# Patient Record
Sex: FEMALE | Race: WHITE | HISPANIC OR LATINO | Employment: FULL TIME | URBAN - METROPOLITAN AREA
[De-identification: names, ages, dates, MRNs, and addresses within clinical notes are randomized per-mention and may not be internally consistent; named-entity substitution may affect disease eponyms.]

---

## 2017-02-14 ENCOUNTER — OFFICE VISIT (OUTPATIENT)
Dept: URGENT CARE | Facility: URGENT CARE | Age: 27
End: 2017-02-14
Payer: COMMERCIAL

## 2017-02-14 VITALS
SYSTOLIC BLOOD PRESSURE: 100 MMHG | WEIGHT: 217.5 LBS | HEART RATE: 90 BPM | OXYGEN SATURATION: 100 % | TEMPERATURE: 97.7 F | BODY MASS INDEX: 38.53 KG/M2 | DIASTOLIC BLOOD PRESSURE: 70 MMHG

## 2017-02-14 DIAGNOSIS — J02.9 ACUTE PHARYNGITIS, UNSPECIFIED ETIOLOGY: Primary | ICD-10-CM

## 2017-02-14 DIAGNOSIS — J01.90 ACUTE SINUSITIS WITH SYMPTOMS > 10 DAYS: ICD-10-CM

## 2017-02-14 LAB
DEPRECATED S PYO AG THROAT QL EIA: NORMAL
MICRO REPORT STATUS: NORMAL
SPECIMEN SOURCE: NORMAL

## 2017-02-14 PROCEDURE — 87880 STREP A ASSAY W/OPTIC: CPT | Performed by: PHYSICIAN ASSISTANT

## 2017-02-14 PROCEDURE — 99203 OFFICE O/P NEW LOW 30 MIN: CPT | Performed by: FAMILY MEDICINE

## 2017-02-14 PROCEDURE — 87081 CULTURE SCREEN ONLY: CPT | Performed by: PHYSICIAN ASSISTANT

## 2017-02-14 RX ORDER — AMOXICILLIN 500 MG/1
1000 CAPSULE ORAL 2 TIMES DAILY
Qty: 40 CAPSULE | Refills: 0 | Status: SHIPPED | OUTPATIENT
Start: 2017-02-14 | End: 2017-02-24

## 2017-02-14 NOTE — LETTER
[facility]  [address], [place] [id number] [phone]      February 14, 2017    RE:  Chely Sharp                                                                                                                                                       [address]   [facility] [address]            To whom it may concern:    Chely Sharp is under my professional care for a medical illness.   She  may return to work with no restrictions after she has been on antibiotics for 24 hours.    Sincerely,        Malorie Cook M.D.  [facility]

## 2017-02-14 NOTE — MR AVS SNAPSHOT
"              After Visit Summary   2017    Chely Sharp    MRN: [id number]           Patient Information     Date Of Birth          1990        Visit Information        Provider Department      2017 8:45 PM Malorie Cook MD [facility]        Today's Diagnoses     Acute pharyngitis, unspecified etiology    -  1    Acute sinusitis with symptoms > 10 days           Follow-ups after your visit        Who to contact     If you have questions or need follow up information about today's clinic visit or your schedule please contact [facility] directly at [phone].  Normal or non-critical lab and imaging results will be communicated to you by MyChart, letter or phone within 4 business days after the clinic has received the results. If you do not hear from us within 7 days, please contact the clinic through [employer] or phone. If you have a critical or abnormal lab result, we will notify you by phone as soon as possible.  Submit refill requests through [employer] or call your pharmacy and they will forward the refill request to us. Please allow 3 business days for your refill to be completed.          Additional Information About Your Visit        MyChart Information     [employer] lets you send messages to your doctor, view your test results, renew your prescriptions, schedule appointments and more. To sign up, go to [email or web address] . Click on \"Log in\" on the left side of the screen, which will take you to the Welcome page. Then click on \"Sign up Now\" on the right side of the page.     You will be asked to enter the access code listed below, as well as some personal information. Please follow the directions to create your username and password.     Your access code is: [email or web address]  Expires: 3/25/2017  3:41 PM     Your access code will  in 90 days. If you need help or a new code, please call your [facility] clinic or [phone].        Care EveryWhere ID     " This is your Care EveryWhere ID. This could be used by other organizations to access your [facility] medical records  [id number]        Your Vitals Were     Pulse Temperature Pulse Oximetry BMI (Body Mass Index)          90 97.7  F (36.5  C) (Oral) 100% 38.53 kg/m2         Blood Pressure from Last 3 Encounters:   02/14/17 100/70   12/25/16 131/48   07/25/16 123/56    Weight from Last 3 Encounters:   02/14/17 217 lb 8 oz (98.7 kg)   07/25/16 220 lb (99.8 kg)   12/13/15 210 lb (95.3 kg)              We Performed the Following     Beta strep group A culture     Strep, Rapid Screen          Today's Medication Changes          These changes are accurate as of: 2/14/17  9:12 PM.  If you have any questions, ask your nurse or doctor.               Start taking these medicines.        Dose/Directions    amoxicillin 500 MG capsule   Commonly known as:  AMOXIL   Used for:  Acute sinusitis with symptoms > 10 days   Started by:  Malorie Cook MD        Dose:  1000 mg   Take 2 capsules (1,000 mg) by mouth 2 times daily for 10 days   Quantity:  40 capsule   Refills:  0            Where to get your medicines      These medications were sent to [facility] - [place] - [address] [facility]  AT [facility] [address] [place] [address] [facility] [place] [address]     Phone:  [phone]     amoxicillin 500 MG capsule                Primary Care Provider Office Phone # Fax #    [facility] [phone]       [address] [place] [address]        Thank you!     Thank you for choosing [facility]  for your care. Our goal is always to provide you with excellent care. Hearing back from our patients is one way we can continue to improve our services. Please take a few minutes to complete the written survey that you may receive in the mail after your visit with us. Thank you!             Your Updated Medication List - Protect others around you: Learn how to safely use,  store and throw away your medicines at www.disposemymeds.org.          This list is accurate as of: 2/14/17  9:12 PM.  Always use your most recent med list.                   Brand Name Dispense Instructions for use    amoxicillin 500 MG capsule    AMOXIL    40 capsule    Take 2 capsules (1,000 mg) by mouth 2 times daily for 10 days

## 2017-02-15 NOTE — PROGRESS NOTES
SUBJECTIVE:   Chely Sharp is a 26 year old female presenting with a chief complaint of nasal congestion.  Onset of symptoms was 4 week(s) ago.  Course of illness is same.    Severity moderate  Current and Associated symptoms: sore throat for 1 day.  No known fevers, but has been feeling especially unwell today.  No GI symptoms.  Occasionally dizzy, especially first thing in the morning.  Has had frontal headaches as well.  Predisposing factors include None.    No past medical history on file.    Social History   Substance Use Topics     Smoking status: Never Smoker     Smokeless tobacco: Not on file     Alcohol use Yes      Comment: occ       ROS:  Review of systems negative except as stated above.    OBJECTIVE  :/70  Pulse 90  Temp 97.7  F (36.5  C) (Oral)  Wt 217 lb 8 oz (98.7 kg)  SpO2 100%  BMI 38.53 kg/m2  GENERAL APPEARANCE: healthy, alert and no distress  EYES: anicteric sclerae, conjunctivae clear  HENT: ear canals and TM's normal.  Nose and mouth without ulcers, erythema or lesions.  Turbinates swollen bilaterally.  NECK: supple, nontender, no lymphadenopathy  RESP: lungs clear to auscultation - no rales, rhonchi or wheezes  CV: regular rate and rhythm, normal S1 S2, no murmur noted  SKIN: no suspicious lesions or rashes    RST negative.  Culture pending.    ASSESSMENT:  Acute sinusitis > 10 days    PLAN:  Amoxicillin 1000 mg PO BID x 10 days.  Follow up with primary MD if not improving or if significantly worsening.

## 2017-02-16 LAB
BACTERIA SPEC CULT: NORMAL
MICRO REPORT STATUS: NORMAL
SPECIMEN SOURCE: NORMAL

## 2017-09-23 ENCOUNTER — HOSPITAL ENCOUNTER (EMERGENCY)
Facility: CLINIC | Age: 27
Discharge: HOME | End: 2017-09-23
Attending: NURSE PRACTITIONER | Admitting: NURSE PRACTITIONER
Payer: COMMERCIAL

## 2017-09-23 VITALS
BODY MASS INDEX: 32.65 KG/M2 | HEART RATE: 78 BPM | WEIGHT: 208 LBS | HEIGHT: 67 IN | RESPIRATION RATE: 18 BRPM | TEMPERATURE: 99.1 F | DIASTOLIC BLOOD PRESSURE: 63 MMHG | SYSTOLIC BLOOD PRESSURE: 103 MMHG

## 2017-09-23 DIAGNOSIS — R30.0 DYSURIA: ICD-10-CM

## 2017-09-23 DIAGNOSIS — R35.0 URINARY FREQUENCY: ICD-10-CM

## 2017-09-23 DIAGNOSIS — N39.0 URINARY TRACT INFECTION WITHOUT HEMATURIA, SITE UNSPECIFIED: ICD-10-CM

## 2017-09-23 LAB
ALBUMIN UR-MCNC: NEGATIVE MG/DL
APPEARANCE UR: ABNORMAL
BACTERIA #/AREA URNS HPF: ABNORMAL /HPF
BILIRUB UR QL STRIP: NEGATIVE
COLOR UR AUTO: YELLOW
GLUCOSE UR STRIP-MCNC: NEGATIVE MG/DL
HCG UR QL: NEGATIVE
HGB UR QL STRIP: ABNORMAL
KETONES UR STRIP-MCNC: NEGATIVE MG/DL
LEUKOCYTE ESTERASE UR QL STRIP: ABNORMAL
NITRATE UR QL: NEGATIVE
PH UR STRIP: 6.5 PH (ref 5–7)
RBC #/AREA URNS AUTO: ABNORMAL /HPF
SOURCE: ABNORMAL
SP GR UR STRIP: 1.02 (ref 1–1.03)
UROBILINOGEN UR STRIP-ACNC: 0.2 EU/DL (ref 0.2–1)
WBC #/AREA URNS AUTO: ABNORMAL /HPF

## 2017-09-23 PROCEDURE — 81001 URINALYSIS AUTO W/SCOPE: CPT | Performed by: NURSE PRACTITIONER

## 2017-09-23 PROCEDURE — 81025 URINE PREGNANCY TEST: CPT | Performed by: NURSE PRACTITIONER

## 2017-09-23 PROCEDURE — 99283 EMERGENCY DEPT VISIT LOW MDM: CPT

## 2017-09-23 RX ORDER — NITROFURANTOIN 25; 75 MG/1; MG/1
100 CAPSULE ORAL 2 TIMES DAILY
Qty: 10 CAPSULE | Refills: 0 | Status: SHIPPED | OUTPATIENT
Start: 2017-09-23 | End: 2017-09-28

## 2017-09-23 ASSESSMENT — ENCOUNTER SYMPTOMS
FEVER: 0
HEMATURIA: 0
DIFFICULTY URINATING: 1
DYSURIA: 1
BACK PAIN: 1
FREQUENCY: 1

## 2017-09-23 NOTE — ED AVS SNAPSHOT
Emergency Department    [address] [place] [address]    Phone:  [phone]    Fax:  [phone]                                       Chely Sharp   MRN: [id number]    Department:   Emergency Department   Date of Visit:  9/23/2017           After Visit Summary Signature Page     I have received my discharge instructions, and my questions have been answered. I have discussed any challenges I see with this plan with the nurse or doctor.    ..........................................................................................................................................  Patient/Patient Representative Signature      ..........................................................................................................................................  Patient Representative Print Name and Relationship to Patient    ..................................................               ................................................  Date                                            Time    ..........................................................................................................................................  Reviewed by Signature/Title    ...................................................              ..............................................  Date                                                            Time

## 2017-09-23 NOTE — ED AVS SNAPSHOT
Emergency Department    [address] [facility] [address]    Phone:  [phone]    Fax:  [phone]                                       Chely Sharp   MRN: [id number]    Department:   Emergency Department   Date of Visit:  9/23/2017           Patient Information     Date Of Birth          1990        Your diagnoses for this visit were:     Dysuria     Urinary frequency     Urinary tract infection without hematuria, site unspecified        You were seen by Claudia Carmona APRN CNP.      Follow-up Information     Follow up with Clinic, [facility] In 3 days.    Why:  if no improvement    Contact information:    [address] [place] [address] [phone]          Discharge Instructions         Urinary Tract Infections in Women    Urinary tract infections (UTIs) are most often caused by bacteria (germs). These bacteria enter the urinary tract. The bacteria may come from outside the body. Or they may travel from the skin outside the rectum or vagina into the urethra. Female anatomy makes it easy for bacteria from the bowel to enter a woman s urinary tract, which is the most common source of UTI. This means women develop UTIs more often than men. Pain in or around the urinary tract is a common UTI symptom. But the only way to know for sure if you have a UTI for the healthcare provider to test your urine. The two tests that may be done are the urinalysis and urine culture.  Types of UTIs    Cystitis: A bladder infection (cystitis) is the most common UTI in women. You may have urgent or frequent urination. You may also have pain, burning when you urinate, and bloody urine.    Urethritis: This is an inflamed urethra, which is the tube that carries urine from the bladder to outside the body. You may have lower stomach or back pain. You may also have urgent or frequent urination.    Pyelonephritis: This is a kidney infection. If not treated, it can be serious and  damage your kidneys. In severe cases, you may be hospitalized. You may have a fever and lower back pain.  Medicines to treat a UTI  Most UTIs are treated with antibiotics. These kill the bacteria. The length of time you need to take them depends on the type of infection. It may be as short as 3 days. If you have repeated UTIs, a low-dose antibiotic may be needed for several months. Take antibiotics exactly as directed. Don t stop taking them until all of the medicine is gone. If you stop taking the antibiotic too soon, the infection may not go away, and you may develop a resistance to the antibiotic. This can make it much harder to treat.  Lifestyle changes to treat and prevent UTIs  The lifestyle changes below will help get rid of your UTI. They may also help prevent future UTIs.    Drink plenty of fluids. This includes water, juice, or other caffeine-free drinks. Fluids help flush bacteria out of your body.    Empty your bladder. Always empty your bladder when you feel the urge to urinate. And always urinate before going to sleep. Urine that stays in your bladder can lead to infection. Try to urinate before and after sex as well.    Practice good personal hygiene. Wipe yourself from front to back after using the toilet. This helps keep bacteria from getting into the urethra.    Use condoms during sex. These help prevent UTIs caused by sexually transmitted bacteria. Also, avoid using spermicides during sex. These can increase the risk of UTIs. Choose other forms of birth control instead. For women who tend to get UTIs after sex, a low-dose of a preventive antibiotic may be used. Be sure to discuss this option with your healthcare provider.    Follow up with your healthcare provider as directed. He or she may test to make sure the infection has cleared. If needed, more treatment may be started.  Date Last Reviewed: 1/1/2017 2000-2017 [employer]. [address] [place] [address], [place] [address]. All rights  reserved. This information is not intended as a substitute for professional medical care. Always follow your healthcare professional's instructions.          24 Hour Appointment Hotline       To make an appointment at any [facility], call [phone] ([phone]). If you don't have a family doctor or clinic, we will help you find one. [facility] [place] clinics are conveniently located to serve the needs of you and your family.             Review of your medicines      START taking        Dose / Directions Last dose taken    nitroFURantoin (macrocrystal-monohydrate) 100 MG capsule   Commonly known as:  MACROBID   Dose:  100 mg   Quantity:  10 capsule        Take 1 capsule (100 mg) by mouth 2 times daily for 5 days   Refills:  0                Prescriptions were sent or printed at these locations (1 Prescription)                   Other Prescriptions                Printed at Department/Unit printer (1 of 1)         nitroFURantoin, macrocrystal-monohydrate, (MACROBID) 100 MG capsule                Procedures and tests performed during your visit     *UA reflex to Microscopic (ED Lab POCT Only 3-11)    HCG qualitative urine    Urine Microscopic      Orders Needing Specimen Collection     None      Pending Results     No orders found from 9/21/2017 to 9/24/2017.            Pending Culture Results     No orders found from 9/21/2017 to 9/24/2017.            Pending Results Instructions     If you had any lab results that were not finalized at the time of your Discharge, you can call the ED Lab Result RN at [phone]. You will be contacted by this team for any positive Lab results or changes in treatment. The nurses are available 7 days a week from 10A to 6:30P.  You can leave a message 24 hours per day and they will return your call.        Test Results From Your Hospital Stay        9/23/2017 10:02 PM      Component Results     Component Value Ref Range & Units Status    Color Urine Yellow  Final    Appearance  Urine Slightly Cloudy  Final    Glucose Urine Negative NEG^Negative mg/dL Final    Bilirubin Urine Negative NEG^Negative Final    Ketones Urine Negative NEG^Negative mg/dL Final    Specific Gravity Urine 1.020 1.003 - 1.035 Final    Blood Urine Trace (A) NEG^Negative Final    pH Urine 6.5 5.0 - 7.0 pH Final    Protein Albumin Urine Negative NEG^Negative mg/dL Final    Urobilinogen Urine 0.2 0.2 - 1.0 EU/dL Final    Nitrite Urine Negative NEG^Negative Final    Leukocyte Esterase Urine Moderate (A) NEG^Negative Final    Source Midstream Urine  Final         9/23/2017  9:53 PM      Component Results     Component Value Ref Range & Units Status    HCG Qual Urine Negative NEG^Negative Final    This test is for screening purposes.  Results should be interpreted along with   the clinical picture.  Confirmation testing is available if warranted by   ordering [id number], HCG Quantitative Pregnancy.           9/23/2017 10:02 PM      Component Results     Component Value Ref Range & Units Status    WBC Urine  (A) OTO2^O - 2 /HPF Final    RBC Urine O - 2 OTO2^O - 2 /HPF Final    Bacteria Urine Few (A) NEG^Negative /HPF Final                Clinical Quality Measure: Blood Pressure Screening     Your blood pressure was checked while you were in the emergency department today. The last reading we obtained was  BP: 103/63 . Please read the guidelines below about what these numbers mean and what you should do about them.  If your systolic blood pressure (the top number) is less than 120 and your diastolic blood pressure (the bottom number) is less than 80, then your blood pressure is normal. There is nothing more that you need to do about it.  If your systolic blood pressure (the top number) is 120-139 or your diastolic blood pressure (the bottom number) is 80-89, your blood pressure may be higher than it should be. You should have your blood pressure rechecked within a year by a primary care provider.  If your systolic blood  "pressure (the top number) is 140 or greater or your diastolic blood pressure (the bottom number) is 90 or greater, you may have high blood pressure. High blood pressure is treatable, but if left untreated over time it can put you at risk for heart attack, stroke, or kidney failure. You should have your blood pressure rechecked by a primary care provider within the next 4 weeks.  If your provider in the emergency department today gave you specific instructions to follow-up with your doctor or provider even sooner than that, you should follow that instruction and not wait for up to 4 weeks for your follow-up visit.        Thank you for choosing [facility]       Thank you for choosing [facility] for your care. Our goal is always to provide you with excellent care. Hearing back from our patients is one way we can continue to improve our services. Please take a few minutes to complete the written survey that you may receive in the mail after you visit with us. Thank you!        [employer] Information     [employer] lets you send messages to your doctor, view your test results, renew your prescriptions, schedule appointments and more. To sign up, go to [email or web address] . Click on \"Log in\" on the left side of the screen, which will take you to the Welcome page. Then click on \"Sign up Now\" on the right side of the page.     You will be asked to enter the access code listed below, as well as some personal information. Please follow the directions to create your username and password.     Your access code is: [email or web address]  Expires: 2017 10:14 PM     Your access code will  in 90 days. If you need help or a new code, please call your [facility] clinic or [phone].        Care EveryWhere ID     This is your Care EveryWhere ID. This could be used by other organizations to access your [facility] medical records  [id number]        Equal Access to Services     JOSE OLIVAS : vidal Renee, " sonu alvares ah. So [facility] [phone].    ATENCIÓN: Si habla ana mañol, tiene a william disposición servicios gratuitos de asistencia lingüística. Llame al [phone].    We comply with applicable federal civil rights laws and [place] laws. We do not discriminate on the basis of race, color, national origin, age, disability sex, sexual orientation or gender identity.            After Visit Summary       This is your record. Keep this with you and show to your community pharmacist(s) and doctor(s) at your next visit.

## 2017-09-24 NOTE — ED PROVIDER NOTES
"  History     Chief Complaint:  Urinary Retention     HPI   Chely Sharp is an otherwise healthy 27 year old female who presents to the emergency department today for evaluation of urinary symptoms. The patient reports intermittent dysuria for the last week that gets worse toward the end of urination and associated with period-like cramps, bloated and full stomach, frequent urination, trouble urinating, and intermittent lower right side back pain. She presents to the ED due to persistent symptoms. She notes having to urinate twice during the night in which she usually does not have to. The patient denies fever and reports her last period was a week and a half ago.     Allergies:  No Known Drug Allergies    Medications:    The patient is currently on no regular medications.    Past Medical History:    History reviewed. No pertinent past medical history.    Past Surgical History:    Gyn Surgery    Family History:    History reviewed. No pertinent family history.     Social History:  The patient was accompanied to the ED by her .  Smoking Status: Never  Alcohol Use: Yes  Marital Status:       Review of Systems   Constitutional: Negative for fever.   Gastrointestinal:        Bloated and full stomach   Genitourinary: Positive for difficulty urinating, dysuria and frequency. Negative for decreased urine volume, hematuria and vaginal bleeding.   Musculoskeletal: Positive for back pain.   All other systems reviewed and are negative.    Physical Exam   First Vitals:  /63  Pulse 78  Temp 99.1  F (37.3  C) (Oral)  Resp 18  Ht 1.702 m (5' 7\")  Wt 94.3 kg (208 lb)  BMI 32.58 kg/m2       Physical Exam  Physical Exam   Constitutional: Pt appears well-developed and well-nourished. Non toxic appearing.   Head: Head moves freely with normal range of motion.   ENT: Oropharynx is clear and moist.   Eyes: Conjunctivae pink. EOMs intact. No scleral icterus.   Neck: Normal range of motion.    Cardiovascular: " Regular rate and rhythm. Normal heart sounds. No concerning murmur.  Pulmonary/Chest: No respiratory distress. No decreased breath sounds. No wheezes. No rhonchi. No rales.   Abdominal: Soft.  No rebound, no guarding. No CVA tenderness. No pain over McBurney's point. Negative Ragsdale's sign. Suprapubic tenderness.   Musculoskeletal: No peripheral edema. Distal capillary refill and sensation intact.  Neurological: Oriented to person, place, and time. No focal deficits.   Skin: Skin is warm and normal in color. No rash noted.      Emergency Department Course     Laboratory:  Laboratory findings were communicated with the patient and family who voiced understanding of the findings.  UA: slightly cloudy yellow urine, trace blood, leukocyte esterase moderate, WBC/HPF , Few Bacteria o/w WNL  HCG Qualitative Urine: Negative     Emergency Department Course:  Nursing notes and vitals reviewed.  The patient provided a urine sample here in the emergency department. This was sent for laboratory testing, findings above.  2130: I performed an exam of the patient as documented above.   2211: Patient rechecked and updated.   I discussed the treatment plan with the patient. They expressed understanding of this plan and consented to discharge. They will be discharged home with instructions for care and follow up. In addition, the patient will return to the emergency department if their symptoms persist, worsen, if new symptoms arise or if there is any concern.  All questions were answered.  I personally reviewed the laboratory results with the Patient and spouse and answered all related questions prior to discharge.    Impression & Plan      Medical Decision Making:  Chely Sharp is a 27 year old female who presents for evaluation of urinary frequency and dysuria.  This clinically is consistent with a urinary tract infection.  Urinalysis confirms the infection.  There has been no fever and she has no CVA tenderness on exam.  She notes intermittent low back pain, but this started with her period. There is no clinical evidence of pyelonephritis, appendicitis, colitis, diverticulitis or any intraabdominal catastrophe. The patient will be started on antibiotics for the infection. No UC necessary given an otherwise healthy non pregnant female. Return if increasing pain, vomiting, fever, or inability to tolerate the oral antibiotic.  Follow up with primary physician is indicated if not improving in 2-3 days. Pt amenable to plan.     Diagnosis:    ICD-10-CM    1. Dysuria R30.0    2. Urinary frequency R35.0    3. Urinary tract infection without hematuria, site unspecified N39.0      Disposition:  discharged to home    Discharge Medications:  New Prescriptions    NITROFURANTOIN, MACROCRYSTAL-MONOHYDRATE, (MACROBID) 100 MG CAPSULE    Take 1 capsule (100 mg) by mouth 2 times daily for 5 days     Scribe Disclosure:  Jackelyn AMBROCIO, am serving as a scribe at 9:39 PM on 9/23/2017 to document services personally performed by Claudia Carmona NP based on my observations and the provider's statements to me.     9/23/2017    EMERGENCY DEPARTMENT       Claudia Carmona APRN CNP  09/23/17 0585

## 2017-09-24 NOTE — DISCHARGE INSTRUCTIONS
Urinary Tract Infections in Women    Urinary tract infections (UTIs) are most often caused by bacteria (germs). These bacteria enter the urinary tract. The bacteria may come from outside the body. Or they may travel from the skin outside the rectum or vagina into the urethra. Female anatomy makes it easy for bacteria from the bowel to enter a woman s urinary tract, which is the most common source of UTI. This means women develop UTIs more often than men. Pain in or around the urinary tract is a common UTI symptom. But the only way to know for sure if you have a UTI for the healthcare provider to test your urine. The two tests that may be done are the urinalysis and urine culture.  Types of UTIs    Cystitis: A bladder infection (cystitis) is the most common UTI in women. You may have urgent or frequent urination. You may also have pain, burning when you urinate, and bloody urine.    Urethritis: This is an inflamed urethra, which is the tube that carries urine from the bladder to outside the body. You may have lower stomach or back pain. You may also have urgent or frequent urination.    Pyelonephritis: This is a kidney infection. If not treated, it can be serious and damage your kidneys. In severe cases, you may be hospitalized. You may have a fever and lower back pain.  Medicines to treat a UTI  Most UTIs are treated with antibiotics. These kill the bacteria. The length of time you need to take them depends on the type of infection. It may be as short as 3 days. If you have repeated UTIs, a low-dose antibiotic may be needed for several months. Take antibiotics exactly as directed. Don t stop taking them until all of the medicine is gone. If you stop taking the antibiotic too soon, the infection may not go away, and you may develop a resistance to the antibiotic. This can make it much harder to treat.  Lifestyle changes to treat and prevent UTIs  The lifestyle changes below will help get rid of your UTI. They may  also help prevent future UTIs.    Drink plenty of fluids. This includes water, juice, or other caffeine-free drinks. Fluids help flush bacteria out of your body.    Empty your bladder. Always empty your bladder when you feel the urge to urinate. And always urinate before going to sleep. Urine that stays in your bladder can lead to infection. Try to urinate before and after sex as well.    Practice good personal hygiene. Wipe yourself from front to back after using the toilet. This helps keep bacteria from getting into the urethra.    Use condoms during sex. These help prevent UTIs caused by sexually transmitted bacteria. Also, avoid using spermicides during sex. These can increase the risk of UTIs. Choose other forms of birth control instead. For women who tend to get UTIs after sex, a low-dose of a preventive antibiotic may be used. Be sure to discuss this option with your healthcare provider.    Follow up with your healthcare provider as directed. He or she may test to make sure the infection has cleared. If needed, more treatment may be started.  Date Last Reviewed: 1/1/2017 2000-2017 [employer]. [address] [place] [address]. All rights reserved. This information is not intended as a substitute for professional medical care. Always follow your healthcare professional's instructions.

## 2018-02-22 ENCOUNTER — HOSPITAL ENCOUNTER (EMERGENCY)
Facility: CLINIC | Age: 28
Discharge: HOME | End: 2018-02-22
Attending: EMERGENCY MEDICINE | Admitting: EMERGENCY MEDICINE

## 2018-02-22 VITALS
WEIGHT: 215 LBS | HEART RATE: 77 BPM | BODY MASS INDEX: 39.56 KG/M2 | HEIGHT: 62 IN | RESPIRATION RATE: 16 BRPM | DIASTOLIC BLOOD PRESSURE: 69 MMHG | TEMPERATURE: 98.2 F | SYSTOLIC BLOOD PRESSURE: 99 MMHG | OXYGEN SATURATION: 99 %

## 2018-02-22 DIAGNOSIS — F41.9 ANXIETY: ICD-10-CM

## 2018-02-22 DIAGNOSIS — R07.9 CHEST PAIN, UNSPECIFIED TYPE: ICD-10-CM

## 2018-02-22 LAB
INTERPRETATION ECG - MUSE: NORMAL
TROPONIN I SERPL-MCNC: <0.015 UG/L (ref 0–0.04)

## 2018-02-22 PROCEDURE — 84484 ASSAY OF TROPONIN QUANT: CPT | Performed by: EMERGENCY MEDICINE

## 2018-02-22 PROCEDURE — 25000132 ZZH RX MED GY IP 250 OP 250 PS 637: Performed by: EMERGENCY MEDICINE

## 2018-02-22 PROCEDURE — 93005 ELECTROCARDIOGRAM TRACING: CPT

## 2018-02-22 PROCEDURE — 99284 EMERGENCY DEPT VISIT MOD MDM: CPT

## 2018-02-22 RX ORDER — IBUPROFEN 400 MG/1
400 TABLET, FILM COATED ORAL ONCE
Status: COMPLETED | OUTPATIENT
Start: 2018-02-22 | End: 2018-02-22

## 2018-02-22 RX ORDER — LORAZEPAM 0.5 MG/1
0.5 TABLET ORAL ONCE
Status: COMPLETED | OUTPATIENT
Start: 2018-02-22 | End: 2018-02-22

## 2018-02-22 RX ADMIN — IBUPROFEN 400 MG: 400 TABLET ORAL at 01:03

## 2018-02-22 RX ADMIN — LORAZEPAM 0.5 MG: 0.5 TABLET ORAL at 01:03

## 2018-02-22 NOTE — ED AVS SNAPSHOT
Emergency Department    [address] [place] [address]    Phone:  [phone]    Fax:  [phone]                                       Chely Sharp   MRN: [id number]    Department:   Emergency Department   Date of Visit:  2/22/2018           After Visit Summary Signature Page     I have received my discharge instructions, and my questions have been answered. I have discussed any challenges I see with this plan with the nurse or doctor.    ..........................................................................................................................................  Patient/Patient Representative Signature      ..........................................................................................................................................  Patient Representative Print Name and Relationship to Patient    ..................................................               ................................................  Date                                            Time    ..........................................................................................................................................  Reviewed by Signature/Title    ...................................................              ..............................................  Date                                                            Time

## 2018-02-22 NOTE — ED AVS SNAPSHOT
Emergency Department    [address] [facility] [address]    Phone:  [phone]    Fax:  [phone]                                       Chely Sharp   MRN: [id number]    Department:   Emergency Department   Date of Visit:  2/22/2018           Patient Information     Date Of Birth          1990        Your diagnoses for this visit were:     Chest pain, unspecified type     Anxiety        You were seen by Darian Perez MD.      Follow-up Information     Follow up with [facility]. Schedule an appointment as soon as possible for a visit in 2 days.    Contact information:    [address] [address]  [phone]          Follow up with  Emergency Department.    Specialty:  EMERGENCY MEDICINE    Why:  As needed, If symptoms worsen    Contact information:    [address] [facility] [id number] [phone]      Discharge References/Attachments     CHEST PAIN, UNCERTAIN CAUSE (ENGLISH)    ANXIETY REACTION (ENGLISH)      24 Hour Appointment Hotline       To make an appointment at any [facility] call [phone] ([phone]). If you don't have a family doctor or clinic, we will help you find one. [facility] clinics are conveniently located to serve the needs of you and your family.             Review of your medicines      Notice     You have not been prescribed any medications.            Procedures and tests performed during your visit     EKG 12 lead    Troponin I      Orders Needing Specimen Collection     None      Pending Results     No orders found from 2/20/2018 to 2/23/2018.            Pending Culture Results     No orders found from 2/20/2018 to 2/23/2018.            Pending Results Instructions     If you had any lab results that were not finalized at the time of your Discharge, you can call the ED Lab Result RN at [phone]. You will be contacted by this team for any positive Lab results or changes in  treatment. The nurses are available 7 days a week from 10A to 6:30P.  You can leave a message 24 hours per day and they will return your call.        Test Results From Your Hospital Stay        2/22/2018  1:42 AM      Component Results     Component Value Ref Range & Units Status    Troponin I ES <0.015 0.000 - 0.045 ug/L Final    The 99th percentile for upper reference range is 0.045 ug/L.  Troponin values   in the range of 0.045 - 0.120 ug/L may be associated with risks of adverse   clinical events.                  Clinical Quality Measure: Blood Pressure Screening     Your blood pressure was checked while you were in the emergency department today. The last reading we obtained was  BP: 112/68 . Please read the guidelines below about what these numbers mean and what you should do about them.  If your systolic blood pressure (the top number) is less than 120 and your diastolic blood pressure (the bottom number) is less than 80, then your blood pressure is normal. There is nothing more that you need to do about it.  If your systolic blood pressure (the top number) is 120-139 or your diastolic blood pressure (the bottom number) is 80-89, your blood pressure may be higher than it should be. You should have your blood pressure rechecked within a year by a primary care provider.  If your systolic blood pressure (the top number) is 140 or greater or your diastolic blood pressure (the bottom number) is 90 or greater, you may have high blood pressure. High blood pressure is treatable, but if left untreated over time it can put you at risk for heart attack, stroke, or kidney failure. You should have your blood pressure rechecked by a primary care provider within the next 4 weeks.  If your provider in the emergency department today gave you specific instructions to follow-up with your doctor or provider even sooner than that, you should follow that instruction and not wait for up to 4 weeks for your follow-up visit.       "  Thank you for choosing [facility]       Thank you for choosing [facility] for your care. Our goal is always to provide you with excellent care. Hearing back from our patients is one way we can continue to improve our services. Please take a few minutes to complete the written survey that you may receive in the mail after you visit with us. Thank you!        [employer] T[employer] Information     [employer] lets you send messages to your doctor, view your test results, renew your prescriptions, schedule appointments and more. To sign up, go to [email or web address] . Click on \"Log in\" on the left side of the screen, which will take you to the Welcome page. Then click on \"Sign up Now\" on the right side of the page.     You will be asked to enter the access code listed below, as well as some personal information. Please follow the directions to create your username and password.     Your access code is: [email or web address]  Expires: 2018  1:53 AM     Your access code will  in 90 days. If you need help or a new code, please call your [facility] clinic or [phone].        Care EveryWhere ID     This is your Care EveryWhere ID. This could be used by other organizations to access your [facility] medical records  [id number]        Equal Access to Services     JOSE OLIVAS : Dom Juárez, waaxda luqadaha, qaybta kaalmada adejenniyada, sonu sánchez. So [facility] [phone].    ATENCIÓN: Si habla español, tiene a william disposición servicios gratuitos de asistencia lingüística. Llame al [phone].    We comply with applicable federal civil rights laws and [place] laws. We do not discriminate on the basis of race, color, national origin, age, disability, sex, sexual orientation, or gender identity.            After Visit Summary       This is your record. Keep this with you and show to your community pharmacist(s) and doctor(s) at your next visit.                  "

## 2018-02-22 NOTE — ED PROVIDER NOTES
"  History     Chief Complaint:  Anxiety and chest pain    HPI   Chely Sharp is a 27 year old female with a history of anxiety who presents to the emergency department today for evaluation of anxiety, shoulder pain, and chest pain and is accompanied by her .  The patient reports intermittent left sided shoulder pain since 1800 this evening that radiates throughout the entirety of her left arm and worsens with movement.  She also reports mid chest pressure that has been present for the past several weeks and lasts for minutes at a time, and that tonight, this was present with the neck pain.  She has not taken anything for pain.  With the combination of symptoms, she states that she became anxious and is primarily concerned with how to manage her anxiety and preventing it before it starts.  She denies any previous diagnoses of anxiety and does not take any medications for this either.  Her last menstrual period occurred 3-4 weeks ago and was regular.     Allergies:  No Known Drug Allergies    Medications:    The patient is currently on no regular medications.       Past Medical History:    History reviewed. No pertinent past medical history.    Past Surgical History:    Gyn surgery    Family History:    History reviewed. No pertinent family history.     Social History:  The patient was accompanied to the ED by her .  Smoking Status: Never smoker  Smokeless Tobacco: Never used  Alcohol Use: Yes  Marital Status:   [2]  The patient is not currently employed.    Review of Systems   All other systems reviewed and are negative.    Physical Exam     Patient Vitals for the past 24 hrs:   BP Temp Temp src Pulse Heart Rate Resp SpO2 Height Weight   02/22/18 0203 99/69 - - 77 - 16 99 % - -   02/22/18 0029 112/68 98.2  F (36.8  C) Oral - 83 12 100 % 1.575 m (5' 2\") 97.5 kg (215 lb)     Physical Exam  General: Anxious but nontoxic-appearing woman sitting upright in room 2,  at bedside  HENT: mucous " membranes moist  CV: regular rate, regular rhythm, no lower extremity edema, no JVD, palpable symmetric radial pulses, no murmur audible  Resp: clear throughout, normal effort, no crackles or wheezing  GI: abdomen soft and nontender, no guarding, negative Ragsdale's sign  MSK: no bony tenderness to chest, mild reproducible tenderness to L trapezius, good ROM L shoulder and remainder of LUE, no palpable joint effusions  No spinal tenderness at any level  Skin: appropriately warm and dry, no erythema or vesicles to chest wall  Neuro: alert, clear speech, oriented, strength and sensation normal throughout LUE  Psych: anxious, cooperative, pleasant      Emergency Department Course     ECG:  ECG taken at 0035, ECG read at 0052  Normal sinus rhythm  Normal ECG  Rate 77 bpm. MT interval 126. QRS duration 82. QT/QTc 380/430. P-R-T axes 48 50 43.    Laboratory:  Laboratory findings were communicated with the patient and family who voiced understanding of the findings.    Troponin (Collected 0107): <0.015    Interventions:  0103 Ativan 0.5 mg PO  0103 ibuprofen 400 mg PO    Emergency Department Course:  Nursing notes and vitals reviewed.  0050: I performed an exam of the patient as documented above.     The patient was placed on continuous cardiac and pulse ox monitoring.    IV was inserted and blood was drawn for laboratory testing, results above.  0155: I rechecked the patient and updated her and her  on the results of laboratory studies.  I discussed the treatment plan with the patient.  She expressed understanding of this plan and consented to discharge.  She will be discharged home with instructions for care and follow up. In addition, the patient will return to the emergency department if her symptoms worsen, if new symptoms arise or if there is any concern.  All questions were answered prior to discharge.    Impression & Plan      Medical Decision Making:  She had anxiety related to her chest pain and other  symptoms that I think are likely benign in etiology.  She is young and generally healthy with reproducible discomfort over the left trapezius.  She is PERC negative, making PE so unlikely a formal testing for is not indicated.  She declined any additional medications.  She did not wish to speak with deck.  She is not suicidal homicidal or hallucinating.  She has normal vitals and a normal cardiopulmonary exam with intermittent symptoms for multiple weeks, and I did not think that chest x-ray would likely demonstrate any serious pathology such as pneumothorax, pulmonary edema, or infiltrate or cardiomegaly.  She is welcome back for acute problems and otherwise provided reassurance and recommendation for close outpatient follow-up.  She is very content with this plan and was discharged home in improved condition.      Diagnosis:    ICD-10-CM    1. Chest pain, unspecified type R07.9    2. Anxiety F41.9      Disposition:   Home    Scribe Disclosure:  Ruchi AMBROCIO, am serving as a scribe at 12:50 AM on 2/22/2018 to document services personally performed by Darian Perez MD, based on my observations and the provider's statements to me.    2/22/2018    EMERGENCY DEPARTMENT       Darian Perez MD  02/22/18 7938

## 2018-09-08 ENCOUNTER — APPOINTMENT (OUTPATIENT)
Dept: GENERAL RADIOLOGY | Facility: CLINIC | Age: 28
End: 2018-09-08
Attending: EMERGENCY MEDICINE

## 2018-09-08 ENCOUNTER — HOSPITAL ENCOUNTER (EMERGENCY)
Facility: CLINIC | Age: 28
Discharge: HOME | End: 2018-09-08
Attending: EMERGENCY MEDICINE | Admitting: EMERGENCY MEDICINE

## 2018-09-08 VITALS
TEMPERATURE: 97.9 F | HEIGHT: 63 IN | SYSTOLIC BLOOD PRESSURE: 117 MMHG | RESPIRATION RATE: 18 BRPM | DIASTOLIC BLOOD PRESSURE: 87 MMHG | BODY MASS INDEX: 38.98 KG/M2 | WEIGHT: 220 LBS | OXYGEN SATURATION: 99 %

## 2018-09-08 DIAGNOSIS — S93.402A SPRAIN OF LEFT ANKLE, UNSPECIFIED LIGAMENT, INITIAL ENCOUNTER: ICD-10-CM

## 2018-09-08 PROCEDURE — 99284 EMERGENCY DEPT VISIT MOD MDM: CPT | Mod: 25

## 2018-09-08 PROCEDURE — 73630 X-RAY EXAM OF FOOT: CPT | Mod: LT

## 2018-09-08 PROCEDURE — 29515 APPLICATION SHORT LEG SPLINT: CPT | Mod: LT

## 2018-09-08 RX ORDER — IBUPROFEN 600 MG/1
600 TABLET, FILM COATED ORAL EVERY 8 HOURS PRN
Qty: 30 TABLET | Refills: 0 | Status: ON HOLD | OUTPATIENT
Start: 2018-09-08 | End: 2019-03-11

## 2018-09-08 ASSESSMENT — ENCOUNTER SYMPTOMS: ARTHRALGIAS: 1

## 2018-09-08 NOTE — ED PROVIDER NOTES
"  History     Chief Complaint:  Foot Pain    HPI   Chely Sharp is a 28 year old female who presents to the emergency department today for evaluation of left foot and ankle pain. The patient reports that last night she was standing on a tree branch about 4 feet off the ground when she put her foot down and jumped off the branch landing awkwardly with forced inversion of the foot and immediate pain over the lateral aspect of her foot. She states that initially she was able to walk on her foot but now it is too painful. She took Advil at about 1030 and went to bed but then reports the pain became so bad that it woke her up. She denies hitting her head or and loss of consciousness. She denies any other injuries. She denies any wounds or other injuries. No knee, hip or back pain. No chest pain or abd pain. She otherwise feels well.     Allergies:  No Known Drug Allergies    Medications:    Medications reviewed. No current medications.     Past Medical History:    Medical history reviewed. No pertinent medical history.    Past Surgical History:    GYN surgery    Family History:    Family history reviewed. No pertinent family history.     Social History:  The patient was accompanied to the ED by her .  Smoking Status: Never Smoker  Smokeless Tobacco: Never Used  Alcohol Use: Positive  Marital Status:      Review of Systems   Musculoskeletal: Positive for arthralgias.   All other systems reviewed and are negative.      Physical Exam     Patient Vitals for the past 24 hrs:   BP Temp Temp src Heart Rate Resp SpO2 Height Weight   09/08/18 0153 123/68 97.9  F (36.6  C) Temporal 81 16 100 % 1.6 m (5' 3\") 99.8 kg (220 lb)     Physical Exam  General: Well appearing, nontoxic. Resting comfortably  Head:  Scalp, face, and head appear normal  Eyes:  Pupils equal, round    Conjunctivae noninjected and sclera white  ENT:    The nose is normal    Ears/pinnae are normal  Neck:  Normal range of " motion  Resp:  Respirations even and unlabored  CV:  DP pulses 2+ and intact bilaterally. Extremities warm and well perfused.  MSK:  Swelling and tenderness to palpation over the inferior left lateral malleolus and lateral foot. Tenderness to palpation is most significant over the left lateral foot. Pain worse with inversion of the foot. Toes and forefoot normal. No wounds. left lower leg, knee, thigh, and hip otherwise normal and nontender. SILT bilateral lower extremities.  Skin:  No rash or lesions noted.  Neuro:  Speech is normal and fluent    Moves all extremities spontaneously  Psych: Awake, Alert. Normal affect      Appropriate interactions           Emergency Department Course   Imaging:  Radiology findings were communicated with the patient who voiced understanding of the findings.    Foot XR, G/E 3 views, left  No visualized acute fracture or malalignment of the left  foot.  Reading per radiology     Emergency Department Course:    0203 Nursing notes and vitals reviewed.    0225 The patient was sent for a Foot XR, G/E 3 views, left while in the emergency department, results above.     0247 I performed an exam of the patient as documented above.     0256 I personally reviewed the imaging results with the patient and answered all related questions prior to discharge.    Impression & Plan      Medical Decision Making:  Chely Sharp is a 28 year old female who presents to the emergency department today for evaluation of left foot and ankle pain.  Signs and symptoms are consistent with an foot/ankle sprain.  This involves predominantly the lateral aspect of the foot/ligaments. No signs of septic arthritis, gout, pseudogout, fracture, cellulitis, etc.  There are no signs of fracture by xray imaging.  The patients neurovascular status is normal. Patient denies any other symptoms or injuries that would suggest further trauma.  Plan is for protected weightbearing, ACE wrap, air cast ankle splint, RICE treatment.   Patient will advance weightbearing and follow-up with primary as needed.  They will begin gentle ROM exercises of the ankle. Return precautions were discussed with patient. The patient's questions were answered and the patient was agreeable with discharge.     Diagnosis:    ICD-10-CM    1. Sprain of left ankle, unspecified ligament, initial encounter S93.402A      Disposition:   The patient is discharged to home.    Discharge Medications:  New Prescriptions    IBUPROFEN (ADVIL/MOTRIN) 600 MG TABLET    Take 1 tablet (600 mg) by mouth every 8 hours as needed for moderate pain     Scribe Disclosure:  Claudia AMBROCIO, am serving as a scribe at 2:07 AM on 9/8/2018 to document services personally performed by Vargas Bardales MD based on my observations and the provider's statements to me.     EMERGENCY DEPARTMENT       Vargas Bardales MD  09/08/18 7148

## 2018-09-08 NOTE — ED AVS SNAPSHOT
Emergency Department    [address] [place] [address]    Phone:  [phone]    Fax:  [phone]                                       Chely Sharp   MRN: [id number]    Department:   Emergency Department   Date of Visit:  9/8/2018           After Visit Summary Signature Page     I have received my discharge instructions, and my questions have been answered. I have discussed any challenges I see with this plan with the nurse or doctor.    ..........................................................................................................................................  Patient/Patient Representative Signature      ..........................................................................................................................................  Patient Representative Print Name and Relationship to Patient    ..................................................               ................................................  Date                                            Time    ..........................................................................................................................................  Reviewed by Signature/Title    ...................................................              ..............................................  Date                                                            Time          22EPIC Rev 08/18

## 2019-03-11 ENCOUNTER — APPOINTMENT (OUTPATIENT)
Dept: ULTRASOUND IMAGING | Facility: CLINIC | Age: 29
End: 2019-03-11
Attending: EMERGENCY MEDICINE

## 2019-03-11 ENCOUNTER — HOSPITAL ENCOUNTER (OUTPATIENT)
Facility: CLINIC | Age: 29
Discharge: HOME | End: 2019-03-11
Attending: EMERGENCY MEDICINE | Admitting: OBSTETRICS & GYNECOLOGY

## 2019-03-11 ENCOUNTER — ANESTHESIA EVENT (OUTPATIENT)
Dept: SURGERY | Facility: CLINIC | Age: 29
End: 2019-03-11

## 2019-03-11 ENCOUNTER — ANESTHESIA (OUTPATIENT)
Dept: SURGERY | Facility: CLINIC | Age: 29
End: 2019-03-11

## 2019-03-11 VITALS
TEMPERATURE: 98.3 F | DIASTOLIC BLOOD PRESSURE: 68 MMHG | HEART RATE: 117 BPM | OXYGEN SATURATION: 100 % | RESPIRATION RATE: 12 BRPM | SYSTOLIC BLOOD PRESSURE: 104 MMHG

## 2019-03-11 DIAGNOSIS — D62 ACUTE BLOOD LOSS ANEMIA: ICD-10-CM

## 2019-03-11 DIAGNOSIS — O03.4 INCOMPLETE MISCARRIAGE: Primary | ICD-10-CM

## 2019-03-11 DIAGNOSIS — O03.31: ICD-10-CM

## 2019-03-11 DIAGNOSIS — Z98.890 S/P D&C (STATUS POST DILATION AND CURETTAGE): ICD-10-CM

## 2019-03-11 LAB
ABO + RH BLD: NORMAL
ABO + RH BLD: NORMAL
BASOPHILS # BLD AUTO: 0 10E9/L (ref 0–0.2)
BASOPHILS NFR BLD AUTO: 0.2 %
BLD GP AB SCN SERPL QL: NORMAL
BLD PROD TYP BPU: NORMAL
BLOOD BANK CMNT PATIENT-IMP: NORMAL
DIFFERENTIAL METHOD BLD: NORMAL
EOSINOPHIL # BLD AUTO: 0.1 10E9/L (ref 0–0.7)
EOSINOPHIL NFR BLD AUTO: 2 %
ERYTHROCYTE [DISTWIDTH] IN BLOOD BY AUTOMATED COUNT: 13.4 % (ref 10–15)
HCT VFR BLD AUTO: 36.2 % (ref 35–47)
HGB BLD-MCNC: 12 G/DL (ref 11.7–15.7)
HGB BLD-MCNC: 9.6 G/DL (ref 11.7–15.7)
IMM GRANULOCYTES # BLD: 0 10E9/L (ref 0–0.4)
IMM GRANULOCYTES NFR BLD: 0.3 %
LYMPHOCYTES # BLD AUTO: 1.7 10E9/L (ref 0.8–5.3)
LYMPHOCYTES NFR BLD AUTO: 25 %
MCH RBC QN AUTO: 28.8 PG (ref 26.5–33)
MCHC RBC AUTO-ENTMCNC: 33.1 G/DL (ref 31.5–36.5)
MCV RBC AUTO: 87 FL (ref 78–100)
MONOCYTES # BLD AUTO: 0.3 10E9/L (ref 0–1.3)
MONOCYTES NFR BLD AUTO: 4.8 %
NEUTROPHILS # BLD AUTO: 4.5 10E9/L (ref 1.6–8.3)
NEUTROPHILS NFR BLD AUTO: 67.7 %
NRBC # BLD AUTO: 0 10*3/UL
NRBC BLD AUTO-RTO: 0 /100
NUM BPU REQUESTED: 1
PLATELET # BLD AUTO: 255 10E9/L (ref 150–450)
RBC # BLD AUTO: 4.17 10E12/L (ref 3.8–5.2)
SPECIMEN EXP DATE BLD: NORMAL
WBC # BLD AUTO: 6.6 10E9/L (ref 4–11)

## 2019-03-11 PROCEDURE — 25800030 ZZH RX IP 258 OP 636: Performed by: ANESTHESIOLOGY

## 2019-03-11 PROCEDURE — 40000169 ZZH STATISTIC PRE-PROCEDURE ASSESSMENT I: Performed by: OBSTETRICS & GYNECOLOGY

## 2019-03-11 PROCEDURE — 88305 TISSUE EXAM BY PATHOLOGIST: CPT | Mod: 26 | Performed by: OBSTETRICS & GYNECOLOGY

## 2019-03-11 PROCEDURE — 71000014 ZZH RECOVERY PHASE 1 LEVEL 2 FIRST HR: Performed by: OBSTETRICS & GYNECOLOGY

## 2019-03-11 PROCEDURE — 25000566 ZZH SEVOFLURANE, EA 15 MIN: Performed by: OBSTETRICS & GYNECOLOGY

## 2019-03-11 PROCEDURE — 99285 EMERGENCY DEPT VISIT HI MDM: CPT | Mod: 25

## 2019-03-11 PROCEDURE — 27210794 ZZH OR GENERAL SUPPLY STERILE: Performed by: OBSTETRICS & GYNECOLOGY

## 2019-03-11 PROCEDURE — 37000008 ZZH ANESTHESIA TECHNICAL FEE, 1ST 30 MIN: Performed by: OBSTETRICS & GYNECOLOGY

## 2019-03-11 PROCEDURE — 25800030 ZZH RX IP 258 OP 636: Performed by: NURSE ANESTHETIST, CERTIFIED REGISTERED

## 2019-03-11 PROCEDURE — 85025 COMPLETE CBC W/AUTO DIFF WBC: CPT | Performed by: EMERGENCY MEDICINE

## 2019-03-11 PROCEDURE — 86850 RBC ANTIBODY SCREEN: CPT | Performed by: EMERGENCY MEDICINE

## 2019-03-11 PROCEDURE — 85018 HEMOGLOBIN: CPT | Performed by: EMERGENCY MEDICINE

## 2019-03-11 PROCEDURE — 88305 TISSUE EXAM BY PATHOLOGIST: CPT | Performed by: OBSTETRICS & GYNECOLOGY

## 2019-03-11 PROCEDURE — 37000009 ZZH ANESTHESIA TECHNICAL FEE, EACH ADDTL 15 MIN: Performed by: OBSTETRICS & GYNECOLOGY

## 2019-03-11 PROCEDURE — 25000128 H RX IP 250 OP 636: Performed by: NURSE ANESTHETIST, CERTIFIED REGISTERED

## 2019-03-11 PROCEDURE — 25000125 ZZHC RX 250: Performed by: OBSTETRICS & GYNECOLOGY

## 2019-03-11 PROCEDURE — 25000132 ZZH RX MED GY IP 250 OP 250 PS 637: Performed by: OBSTETRICS & GYNECOLOGY

## 2019-03-11 PROCEDURE — 86900 BLOOD TYPING SEROLOGIC ABO: CPT | Performed by: EMERGENCY MEDICINE

## 2019-03-11 PROCEDURE — 00000159 ZZHCL STATISTIC H-SEND OUTS PREP: Performed by: OBSTETRICS & GYNECOLOGY

## 2019-03-11 PROCEDURE — 86923 COMPATIBILITY TEST ELECTRIC: CPT | Performed by: EMERGENCY MEDICINE

## 2019-03-11 PROCEDURE — 25000125 ZZHC RX 250: Performed by: NURSE ANESTHETIST, CERTIFIED REGISTERED

## 2019-03-11 PROCEDURE — 76801 OB US < 14 WKS SINGLE FETUS: CPT

## 2019-03-11 PROCEDURE — 25000128 H RX IP 250 OP 636: Performed by: EMERGENCY MEDICINE

## 2019-03-11 PROCEDURE — 86901 BLOOD TYPING SEROLOGIC RH(D): CPT | Performed by: EMERGENCY MEDICINE

## 2019-03-11 PROCEDURE — 36000052 ZZH SURGERY LEVEL 2 EA 15 ADDTL MIN: Performed by: OBSTETRICS & GYNECOLOGY

## 2019-03-11 PROCEDURE — 36000050 ZZH SURGERY LEVEL 2 1ST 30 MIN: Performed by: OBSTETRICS & GYNECOLOGY

## 2019-03-11 RX ORDER — KETOROLAC TROMETHAMINE 30 MG/ML
INJECTION, SOLUTION INTRAMUSCULAR; INTRAVENOUS PRN
Status: DISCONTINUED | OUTPATIENT
Start: 2019-03-11 | End: 2019-03-11

## 2019-03-11 RX ORDER — SODIUM CHLORIDE, SODIUM LACTATE, POTASSIUM CHLORIDE, CALCIUM CHLORIDE 600; 310; 30; 20 MG/100ML; MG/100ML; MG/100ML; MG/100ML
INJECTION, SOLUTION INTRAVENOUS CONTINUOUS
Status: DISCONTINUED | OUTPATIENT
Start: 2019-03-11 | End: 2019-03-11 | Stop reason: HOSPADM

## 2019-03-11 RX ORDER — DOXYCYCLINE 100 MG/10ML
100 INJECTION, POWDER, LYOPHILIZED, FOR SOLUTION INTRAVENOUS
Status: COMPLETED | OUTPATIENT
Start: 2019-03-11 | End: 2019-03-11

## 2019-03-11 RX ORDER — NALOXONE HYDROCHLORIDE 0.4 MG/ML
.1-.4 INJECTION, SOLUTION INTRAMUSCULAR; INTRAVENOUS; SUBCUTANEOUS
Status: DISCONTINUED | OUTPATIENT
Start: 2019-03-11 | End: 2019-03-12 | Stop reason: HOSPADM

## 2019-03-11 RX ORDER — ONDANSETRON 4 MG/1
4 TABLET, ORALLY DISINTEGRATING ORAL EVERY 30 MIN PRN
Status: DISCONTINUED | OUTPATIENT
Start: 2019-03-11 | End: 2019-03-11 | Stop reason: HOSPADM

## 2019-03-11 RX ORDER — EPHEDRINE SULFATE 50 MG/ML
INJECTION, SOLUTION INTRAMUSCULAR; INTRAVENOUS; SUBCUTANEOUS PRN
Status: DISCONTINUED | OUTPATIENT
Start: 2019-03-11 | End: 2019-03-11

## 2019-03-11 RX ORDER — ACETAMINOPHEN 325 MG/1
650 TABLET ORAL
Status: DISCONTINUED | OUTPATIENT
Start: 2019-03-11 | End: 2019-03-12 | Stop reason: HOSPADM

## 2019-03-11 RX ORDER — MEPERIDINE HYDROCHLORIDE 25 MG/ML
12.5 INJECTION INTRAMUSCULAR; INTRAVENOUS; SUBCUTANEOUS EVERY 5 MIN PRN
Status: DISCONTINUED | OUTPATIENT
Start: 2019-03-11 | End: 2019-03-11 | Stop reason: HOSPADM

## 2019-03-11 RX ORDER — FENTANYL CITRATE 50 UG/ML
25-50 INJECTION, SOLUTION INTRAMUSCULAR; INTRAVENOUS
Status: DISCONTINUED | OUTPATIENT
Start: 2019-03-11 | End: 2019-03-11 | Stop reason: HOSPADM

## 2019-03-11 RX ORDER — MAGNESIUM HYDROXIDE 1200 MG/15ML
LIQUID ORAL PRN
Status: DISCONTINUED | OUTPATIENT
Start: 2019-03-11 | End: 2019-03-11 | Stop reason: HOSPADM

## 2019-03-11 RX ORDER — DEXAMETHASONE SODIUM PHOSPHATE 4 MG/ML
INJECTION, SOLUTION INTRA-ARTICULAR; INTRALESIONAL; INTRAMUSCULAR; INTRAVENOUS; SOFT TISSUE PRN
Status: DISCONTINUED | OUTPATIENT
Start: 2019-03-11 | End: 2019-03-11

## 2019-03-11 RX ORDER — OXYCODONE HYDROCHLORIDE 5 MG/1
5 TABLET ORAL
Status: DISCONTINUED | OUTPATIENT
Start: 2019-03-11 | End: 2019-03-12 | Stop reason: HOSPADM

## 2019-03-11 RX ORDER — METHYLERGONOVINE MALEATE 0.2 MG/1
0.2 TABLET ORAL EVERY 8 HOURS
Qty: 3 TABLET | Refills: 0 | Status: SHIPPED | OUTPATIENT
Start: 2019-03-11

## 2019-03-11 RX ORDER — HYDROMORPHONE HYDROCHLORIDE 1 MG/ML
.3-.5 INJECTION, SOLUTION INTRAMUSCULAR; INTRAVENOUS; SUBCUTANEOUS EVERY 5 MIN PRN
Status: DISCONTINUED | OUTPATIENT
Start: 2019-03-11 | End: 2019-03-11 | Stop reason: HOSPADM

## 2019-03-11 RX ORDER — ONDANSETRON 2 MG/ML
INJECTION INTRAMUSCULAR; INTRAVENOUS PRN
Status: DISCONTINUED | OUTPATIENT
Start: 2019-03-11 | End: 2019-03-11

## 2019-03-11 RX ORDER — ONDANSETRON 2 MG/ML
4 INJECTION INTRAMUSCULAR; INTRAVENOUS EVERY 30 MIN PRN
Status: DISCONTINUED | OUTPATIENT
Start: 2019-03-11 | End: 2019-03-11 | Stop reason: HOSPADM

## 2019-03-11 RX ORDER — FERROUS SULFATE 325(65) MG
325 TABLET ORAL 2 TIMES DAILY
Qty: 60 TABLET | Refills: 0 | Status: SHIPPED | OUTPATIENT
Start: 2019-03-11

## 2019-03-11 RX ORDER — IBUPROFEN 600 MG/1
600 TABLET, FILM COATED ORAL EVERY 6 HOURS PRN
Qty: 30 TABLET | Refills: 0 | Status: SHIPPED | OUTPATIENT
Start: 2019-03-11

## 2019-03-11 RX ORDER — FENTANYL CITRATE 50 UG/ML
INJECTION, SOLUTION INTRAMUSCULAR; INTRAVENOUS PRN
Status: DISCONTINUED | OUTPATIENT
Start: 2019-03-11 | End: 2019-03-11

## 2019-03-11 RX ORDER — PROPOFOL 10 MG/ML
INJECTION, EMULSION INTRAVENOUS PRN
Status: DISCONTINUED | OUTPATIENT
Start: 2019-03-11 | End: 2019-03-11

## 2019-03-11 RX ORDER — LIDOCAINE HYDROCHLORIDE 20 MG/ML
INJECTION, SOLUTION INFILTRATION; PERINEURAL PRN
Status: DISCONTINUED | OUTPATIENT
Start: 2019-03-11 | End: 2019-03-11

## 2019-03-11 RX ADMIN — LIDOCAINE HYDROCHLORIDE 100 MG: 20 INJECTION, SOLUTION INFILTRATION; PERINEURAL at 21:51

## 2019-03-11 RX ADMIN — FENTANYL CITRATE 25 MCG: 50 INJECTION, SOLUTION INTRAMUSCULAR; INTRAVENOUS at 21:59

## 2019-03-11 RX ADMIN — Medication 5 MG: at 22:02

## 2019-03-11 RX ADMIN — SODIUM CHLORIDE, POTASSIUM CHLORIDE, SODIUM LACTATE AND CALCIUM CHLORIDE: 600; 310; 30; 20 INJECTION, SOLUTION INTRAVENOUS at 21:46

## 2019-03-11 RX ADMIN — PHENYLEPHRINE HYDROCHLORIDE 100 MCG: 10 INJECTION, SOLUTION INTRAMUSCULAR; INTRAVENOUS; SUBCUTANEOUS at 22:06

## 2019-03-11 RX ADMIN — Medication 5 MG: at 21:55

## 2019-03-11 RX ADMIN — SODIUM CHLORIDE 1000 ML: 9 INJECTION, SOLUTION INTRAVENOUS at 21:13

## 2019-03-11 RX ADMIN — ONDANSETRON 4 MG: 2 INJECTION INTRAMUSCULAR; INTRAVENOUS at 22:04

## 2019-03-11 RX ADMIN — DOXYCYCLINE 100 MG: 100 INJECTION, POWDER, LYOPHILIZED, FOR SOLUTION INTRAVENOUS at 21:57

## 2019-03-11 RX ADMIN — DEXAMETHASONE SODIUM PHOSPHATE 4 MG: 4 INJECTION, SOLUTION INTRA-ARTICULAR; INTRALESIONAL; INTRAMUSCULAR; INTRAVENOUS; SOFT TISSUE at 21:59

## 2019-03-11 RX ADMIN — PROPOFOL 30 MG: 10 INJECTION, EMULSION INTRAVENOUS at 22:04

## 2019-03-11 RX ADMIN — SODIUM CHLORIDE 1000 ML: 9 INJECTION, SOLUTION INTRAVENOUS at 20:36

## 2019-03-11 RX ADMIN — PROPOFOL 200 MG: 10 INJECTION, EMULSION INTRAVENOUS at 21:47

## 2019-03-11 RX ADMIN — Medication 5 MG: at 22:06

## 2019-03-11 RX ADMIN — ACETAMINOPHEN 650 MG: 325 TABLET, FILM COATED ORAL at 22:53

## 2019-03-11 RX ADMIN — MIDAZOLAM 2 MG: 1 INJECTION INTRAMUSCULAR; INTRAVENOUS at 21:47

## 2019-03-11 RX ADMIN — KETOROLAC TROMETHAMINE 30 MG: 30 INJECTION, SOLUTION INTRAMUSCULAR at 22:12

## 2019-03-11 RX ADMIN — Medication 10 MG: at 21:57

## 2019-03-11 RX ADMIN — FENTANYL CITRATE 50 MCG: 50 INJECTION, SOLUTION INTRAMUSCULAR; INTRAVENOUS at 21:51

## 2019-03-11 RX ADMIN — PHENYLEPHRINE HYDROCHLORIDE 100 MCG: 10 INJECTION, SOLUTION INTRAMUSCULAR; INTRAVENOUS; SUBCUTANEOUS at 21:53

## 2019-03-11 RX ADMIN — FENTANYL CITRATE 25 MCG: 50 INJECTION, SOLUTION INTRAMUSCULAR; INTRAVENOUS at 22:28

## 2019-03-11 RX ADMIN — Medication 5 MG: at 22:01

## 2019-03-11 ASSESSMENT — ENCOUNTER SYMPTOMS
BACK PAIN: 1
ABDOMINAL PAIN: 1

## 2019-03-12 NOTE — ED NOTES
Pt BP dropped to 90s systolic, per MD a blood clot was removed and pt began bleeding profusely. A second IV was started and pressure bag was applied to 1L NS.

## 2019-03-12 NOTE — ANESTHESIA CARE TRANSFER NOTE
Patient: Chely Sharp    Procedure(s):  DILATION AND CURETTAGE SUCTION    Diagnosis: unknown  Diagnosis Additional Information: No value filed.    Anesthesia Type:   General     Note:  Airway :Face Mask  Patient transferred to:PACU  Comments: VSS on arrival to PACU. Alert and talking, on 6L SFM 02. Report given to RN before transfer of patient care.Handoff Report: Identifed the Patient, Identified the Reponsible Provider, Reviewed the pertinent medical history, Discussed the surgical course, Reviewed Intra-OP anesthesia mangement and issues during anesthesia, Set expectations for post-procedure period and Allowed opportunity for questions and acknowledgement of understanding      Vitals: (Last set prior to Anesthesia Care Transfer)    CRNA VITALS  3/11/2019 2149 - 3/11/2019 2226      3/11/2019             Pulse:  114    SpO2:  100 %    Resp Rate (observed):  16                Electronically Signed By: CHEL Salcedo CRNA  March 11, 2019  10:26 PM

## 2019-03-12 NOTE — ANESTHESIA PREPROCEDURE EVALUATION
Anesthesia Pre-Procedure Evaluation    Patient: Chely Sharp   MRN: [id number] : 1990          Preoperative Diagnosis: unknown    Procedure(s):  DILATION AND CURETTAGE SUCTION    No past medical history on file.  Past Surgical History:   Procedure Laterality Date     GYN SURGERY         Anesthesia Evaluation     .             ROS/MED HX    ENT/Pulmonary:  - neg pulmonary ROS     Neurologic:  - neg neurologic ROS     Cardiovascular:  - neg cardiovascular ROS       METS/Exercise Tolerance:     Hematologic:     (+) Anemia, -      Musculoskeletal:  - neg musculoskeletal ROS       GI/Hepatic:  - neg GI/hepatic ROS       Renal/Genitourinary:     (+) Other Renal/ Genitourinary, Missed Ab at approx 9wks      Endo:  - neg endo ROS       Psychiatric:  - neg psychiatric ROS       Infectious Disease:  - neg infectious disease ROS       Malignancy:         Other:                          Physical Exam  Normal systems: cardiovascular, pulmonary and dental    Airway   Mallampati: II  TM distance: >3 FB  Neck ROM: full    Dental     Cardiovascular       Pulmonary             Lab Results   Component Value Date    WBC 6.6 2019    HGB 9.6 (L) 2019    HCT 36.2 2019     2019     2015    POTASSIUM 3.2 (L) 2015    CHLORIDE 106 2015    CO2 26 2015    BUN 9 2015    CR 0.64 2015    GLC 96 2015    BRENT 8.4 (L) 2015    ALBUMIN 3.9 2015    PROTTOTAL 8.2 2015    ALT 22 2015    AST 19 2015    ALKPHOS 111 2015    BILITOTAL 0.3 2015    LIPASE 81 2015    HCG Negative 2017    HCGS Negative 2012       Preop Vitals  BP Readings from Last 3 Encounters:   19 114/76   18 117/87   18 99/69    Pulse Readings from Last 3 Encounters:   19 84   18 77   17 78      Resp Readings from Last 3 Encounters:   19 17   18 18   18 16    SpO2 Readings from Last 3  "Encounters:   03/11/19 100%   09/08/18 99%   02/22/18 99%      Temp Readings from Last 1 Encounters:   03/11/19 37.1  C (98.8  F) (Oral)    Ht Readings from Last 1 Encounters:   09/08/18 1.6 m (5' 3\")      Wt Readings from Last 1 Encounters:   09/08/18 99.8 kg (220 lb)    Estimated body mass index is 38.97 kg/m  as calculated from the following:    Height as of 9/8/18: 1.6 m (5' 3\").    Weight as of 9/8/18: 99.8 kg (220 lb).       Anesthesia Plan      History & Physical Review  History and physical reviewed and following examination; no interval change.    ASA Status:  2 emergent.    NPO Status:  > 8 hours    Plan for General and LMA with Intravenous induction. Maintenance will be Balanced.    PONV prophylaxis:  Ondansetron (or other 5HT-3) and Dexamethasone or Solumedrol       Postoperative Care  Postoperative pain management:  IV analgesics.      Consents  Anesthetic plan, risks, benefits and alternatives discussed with:  Patient..                 Liam Diaz, DO, DO  "

## 2019-03-12 NOTE — ANESTHESIA POSTPROCEDURE EVALUATION
Patient: Chely Sharp    Procedure(s):  DILATION AND CURETTAGE SUCTION    Diagnosis:unknown  Diagnosis Additional Information: No value filed.    Anesthesia Type:  General    Note:  Anesthesia Post Evaluation    Patient location during evaluation: bedside  Patient participation: Able to fully participate in evaluation  Level of consciousness: awake  Pain management: adequate  Airway patency: patent  Cardiovascular status: acceptable  Respiratory status: acceptable  Hydration status: acceptable  PONV: none     Anesthetic complications: None    Comments: No anesthetic complications noted.         Last vitals:  Vitals:    03/11/19 2250 03/11/19 2300 03/11/19 2310   BP: 102/59 105/52 90/62   Pulse: 101 103 101   Resp: 10 12 13   Temp:      SpO2: 100% 100% 100%         Electronically Signed By: Liam Diaz DO, DO  March 11, 2019  11:23 PM

## 2019-03-12 NOTE — H&P
Gynecology H&P    Reason for Consult: vaginal bleeding, miscarriage    HPI: Chely Sharp is a 28 year old  who presented to the ED with vaginal bleeding.     She was diagnosed with a spontaneous missed  at 11 weeks last week at her prenatal visit at [facility]. States the fetus was only measuring 9 weeks and had no cardiac activity.     Was seen in clinic and was being expectantly managed.    Reports bright red bleeding starting earlier today, with significantly heavier bleeding and cramping later in the afternoon. Thinks she passed the fetus at home. She passed a large blood clot at home and felt dizzy so that is what prompted her to present to the ED.    She had an almost syncopal episode in the ED and was found to be hypotensive in the 80/50s.  She responded to a 1 L bolus of IV fluids.     She receives her care at the [facility].    She is O positive. Has no allergies to medications.      OB/Gyn Hx:     History of  section x 2  Possible missed AB in the past, never had clinical diagnosis of pregnancy but had heavy bleeding      PMH:   None    PSH:    section x 2    Social Hx:   Social History     Tobacco Use     Smoking status: Never Smoker     Smokeless tobacco: Never Used   Substance Use Topics     Alcohol use: Yes     Comment: occ     Drug use: No        Family History: family history is not on file.  No family history of breast, ovarian or uterine cancer. No history of DVT or migranes.    ROS:   Negative except as listed in HPI    O: /73   Pulse 84   Temp 98.8  F (37.1  C) (Oral)   Resp 17   SpO2 100%   Constitutional: Healthy appearing female, pale, no acute distress  Cardiovascular: Regular rate and rhythm without murmurs, clicks, gallops or rub  Respiratory: Clear to auscultation bilaterally without crackles or wheeze  Gastrointestinal: Abdomen soft,no masses, organomegaly  Pelvic Exam - : peripad soaked with blood, approximately 400 ml  blood clot on pad  Skin: No suspicious lesions or rashes  Psychiatric: mentation appears normal and affect normal/bright    Assessment: Chely Sharp is a 28 year old  who presented with vaginal bleeding and spontaneous  at 11 weeks gestation, hemorrhaging in the ED with a 3 point drop in Hgb and hypotension.    Plan:  - To OR for urgent suction dilation and curettage  - Anesthesia aware  - Accepting of blood products if needed  - O positive blood type, no need for Rhogam  - Plan discharge home later tonight if stable, otherwise will observe overnight and likely discharge in AM    Radha Machado MD  OB/GYN & Infertility  Pager [phone]  19

## 2019-03-12 NOTE — OP NOTE
OB/GYN Operative Note    Date of service: 19    Patient: Chely Sharp     MRN: [id number]     : 1990     Pre-operative diagnosis:  Incomplete spontaneous   Vaginal bleeding  Acute blood loss anemia  Rh positive status    Post-operative diagnosis:  Same as above    Surgeon:  Jazmyne Machado MD    Assistant:  OR Staff    Procedure:  Suction Dilation & curettage    Anesthesia: General LMA    EBL: 30 mL from procedure, ~550 mL of clot evacuated prior to D&C    Complications: None    Specimen: Products of conception    Indication: Chely Sharp is a 28 year old  who was diagnosed with a missed  last week at [facility]. She was 11 weeks by LMP dating but was only measuring 9 weeks gestation. At that time, expectant management was undertaken. She started bleeding this morning which became heavier throughout the day. She then started to pass tissue and large clots. In the ED, she was found to be hypotensive and her Hgb dropped from 12.2 to 9.6. She had a pelvic ultrasound demonstrating products of conception in the uterus and was continuing to pass large clots. She almost had a syncopal episode in the ED. Due to these findings, a dilation and curettage was recommended. The risks, benefits and alternatives to the procedure were discussed in detail. Written consent was obtained.    Findings:    Exam under anesthesia reveals 10 week sized uterus with significant blood clots evacuated, approximately 550 mL in addition to what was on the chux.  Large amount of products of conception on suction curettage.  Surgical sites hemostatic at end of procedure.    Procedure:  Patient was taken to the OR where general LMA anesthesia was undertaken.  Once patient was comfortable she was positioned in the dorsal lithotomy position.  She was then prepped and draped in the normal sterile fashion.  A presurgical pause was undertaken and the proper patient and procedure were identified.  A sterile  speculum was placed in the vagina and the cervix was visualized. A single toothed tenaculum was placed on the anterior lip.  The cervix was then progressively dilated with hegar dilators to 10 mm. The 9 mm rigid curved suction curette was introduced into the uterine cavity. Adequate pressure was noted and the suction curettage was performed, starting at the fundus and slowly removing a large amount of products of conception with 4 passes. A sharp curette was then introduced and three passes along all four quadrants were made with minimal return of tissue. One final pass was made with a 7 mm rigid curved suction curette to ensure that all the products were removed. The tissue was sent to pathology.  The tenaculum was removed and hemostasis was achieved with pressure. The speculum was then removed.      Sharps and lap counts correct times two. The patient received IV doxycycline prior to the procedure.  Products of conception were sent to pathology for definitive diagnosis.     Radha Machado MD  OB/GYN & Infertility  Pager [phone]  03/11/19

## 2019-03-12 NOTE — OR NURSING
Pt discharged to home denies pain declined po oxycontin. Instructions reviewed with spouse questions answered. VSS no vaginal bleeding at discharge , baljit pad clean and dry.

## 2019-03-12 NOTE — DISCHARGE INSTRUCTIONS
Please follow up with Dr. Machado at Ob/GYN and Infertility in 1 week. We are located on the 4th floor of [facility]. Please call [phone] to schedule an appointment.     [facility]  D&C OR Laparoscopy  Discharge Instructions    ACTIVITY:  You may restart normal activities as your abdominal discomfort disappears.  You may expect some discomfort under the ribs and shoulder area for the first 24 hours.  In nearly all cases, this will disappear shortly after the first day.  It is certainly permissible to climb stairs, shower, and do ordinary, quiet activities.      OFFICE VISIT:  Please call a day or two after your surgery to make an appointment in approximately 1 weeks to discuss the results of your surgery and have your check-up.    VAGINAL DISCHARGE:  You may have some bloody vaginal discharge for as long as one week.  Pelvic rest, no tampons, douching or intercourse for 2 weeks .       TEMPERATURE:  If you develop a temperature elevation of 101  or higher, please call our office immediately.    RESTRICTIONS:  Due to the effects of general anesthesia, please do not drive a car, drink alcoholic beverages, nor operate complex machinery in the first 24 hours following surgery. No strenuous activity for 1 week.    PLEASE FEEL FREE TO CALL OUR OFFICE IF ANY QUESTIONS OR PROBLEMS ARISE.    [facility]   _______________________________________________________________________________                   [facility]              [address] [address]  [address]            [facility] [address]  [place] [address]            [phone] (Telephone)                                               [phone] (Telephone)            [phone] (Fax)  [phone] (Fax)            [facility]    Today you received  Toradol, an antiinflammatory medication similar to Ibuprofen.  You should not take other antiinflammatory medication, such as Ibuprofen, Motrin, Advil, Aleve, Naprosyn, etc until 0600.     Same Day Surgery Discharge Instructions for  Sedation and General Anesthesia       It's not unusual to feel dizzy, light-headed or faint for up to 24 hours after surgery or while taking pain medication.  If you have these symptoms: sit for a few minutes before standing and have someone assist you when you get up to walk or use the bathroom.      You should rest and relax for the next 24 hours. We recommend you make arrangements to have an adult stay with you for at least 24 hours after your discharge.  Avoid hazardous and strenuous activity.      DO NOT DRIVE any vehicle or operate mechanical equipment for 24 hours following the end of your surgery.  Even though you may feel normal, your reactions may be affected by the medication you have received.      Do not drink alcoholic beverages for 24 hours following surgery.       Slowly progress to your regular diet as you feel able. It's not unusual to feel nauseated and/or vomit after receiving anesthesia.  If you develop these symptoms, drink clear liquids (apple juice, ginger ale, broth, 7-up, etc. ) until you feel better.  If your nausea and vomiting persists for 24 hours, please notify your surgeon.        All narcotic pain medications, along with inactivity and anesthesia, can cause constipation. Drinking plenty of liquids and increasing fiber intake will help.      For any questions of a medical nature, call your surgeon.      Do not make important decisions for 24 hours.      If you had general anesthesia, you may have a sore throat for a couple of days related to the breathing tube used during surgery.  You may use Cepacol lozenges to help with this discomfort.  If it worsens or if you develop a fever, contact your surgeon.       If you feel your pain is not well managed with  the pain medications prescribed by your surgeon, please contact your surgeon's office to let them know so they can address your concerns.

## 2019-03-12 NOTE — ED NOTES
"At approx 2030  called staff to bedside, pt stated \"I don't feel so good, I'm going to pass out\". Writer checked pt BP and had EDT check pt's brief to see how much bleeding pt was experiencing. Pt did go unresponsive and BP found to be 80/31, writer had HUC call MD to bedside. Upon MD's arrival pt had regained consciousness at which point 94/49. 18g IV placed in right ac and IVF moved to Rt arm IV and placed on pressure bag. Pt then moved to stabilization room for closer monitoring.   "

## 2019-03-12 NOTE — BRIEF OP NOTE
[facility] Brief Operative Note    Pre-operative diagnosis: Incomplete   Hemorrhage  Acute blood loss anemia   Post-operative diagnosis Same as above   Procedure: Procedure(s):  DILATION AND CURETTAGE SUCTION   Surgeon(s): Surgeon(s) and Role:     * Jazmyne Machado MD - Primary   Estimated blood loss: 600 mL, 30 mL from the actual procedure and the remainder in blood clot evacuated just prior to the D&C      Specimens: ID Type Source Tests Collected by Time Destination   A : productions of conception Products of Conception Uterus SURGICAL PATHOLOGY EXAM Jazmyne Machado MD 3/11/2019 10:12 PM       Findings: Exam under anesthesia reveals 10 week sized uterus with significant blood clots evacuated, approximately 550 mL in addition to what was on the chux.  Large amount of products of conception on suction curettage.  Hemostatic at end of procedure.     Radha Machado MD  OB/GYN & Infertility  Pager [phone]  19

## 2019-03-12 NOTE — ED PROVIDER NOTES
History     Chief Complaint:  Vaginal Bleeding      HPI   Chely Sharp is a otherwise healthy 28 year old female who presents with vaginal bleeding. The patient reports she is currently 3 months pregnant. She reports to have followed up with her OBGYN six days ago where imaging revealed the fetus was at nine weeks development but without a heartbeat.  Additionally, she reports to have had progressively worsening vaginal bleeding since this morning. She states she has been actively miscarrying since 1600 hours today as a large protrusion began to hang from her vagina with associated diffuse abdominal cramping pain and back pain, prompting her presentation. She took 3 ibuprofen today for pain but otherwise no other meds.     Allergies:  No known drug allergies    Medications:    ibuprofen (ADVIL/MOTRIN) 600 MG tablet    Past Medical History:    The patient does not have any past pertinent medical history.    Past Surgical History:    GYN SURGERY    Family History:    History reviewed. No pertinent family history.     Social History:  Marital Status:     Tobacco Status: Never Used  Alcohol Use: Occasionally  Drug Use: No  Presents: With Friend        Review of Systems   Gastrointestinal: Positive for abdominal pain.   Genitourinary: Positive for vaginal bleeding.   Musculoskeletal: Positive for back pain.   All other systems reviewed and are negative.    Physical Exam     Patient Vitals for the past 24 hrs:   BP Temp Temp src Pulse Heart Rate Resp SpO2   03/11/19 2325 104/68 -- -- 117 -- -- --   03/11/19 2324 104/68 -- -- 117 -- -- --   03/11/19 2315 90/62 -- -- -- 98 12 100 %   03/11/19 2310 90/62 -- -- 101 102 13 100 %   03/11/19 2305 -- -- -- -- 96 14 100 %   03/11/19 2300 105/52 -- -- 103 90 12 100 %   03/11/19 2255 -- -- -- -- 93 16 100 %   03/11/19 2250 102/59 -- -- 101 86 10 100 %   03/11/19 2240 101/59 -- -- 106 109 17 100 %   03/11/19 2230 105/61 -- -- 108 124 19 100 %   03/11/19 2220 105/45 98.3   F (36.8  C) Temporal 92 97 21 --   03/11/19 2133 112/61 98.8  F (37.1  C) Temporal -- -- 18 100 %   03/11/19 2113 113/73 -- -- -- 98 -- --   03/11/19 2105 114/76 -- -- -- 87 17 100 %   03/11/19 2100 98/64 -- -- 84 92 28 100 %   03/11/19 2055 106/54 -- -- 96 -- -- 100 %   03/11/19 1928 116/83 98.8  F (37.1  C) Oral -- 109 20 99 %       Physical Exam    General: Resting comfortably on the gurney  Eyes:  The pupils are equal and round    Conjunctivae and sclerae are normal  ENT:    Moist mucous membranes  Neck:  Normal range of motion  CV:  Regular rate and rhythm    Skin warm and well perfused   Resp:  Lungs are clear    Non-labored    No rales    No wheezing   GI:  Abdomen is soft, there is no rigidity    No distension    No rebound tenderness     No abdominal tenderness  :  Clots hanging from vagina, clots at introitus, mild vaginal bleeding  MS:  Normal muscular tone  Skin:  No rash or acute skin lesions noted  Neuro:   Awake, alert.      Speech is normal and fluent.    Face is symmetric.     Moves all extremities equally  Psych: Normal affect.  Appropriate interactions.      Emergency Department Course     Imaging:  Radiographic findings were communicated with the patient and Admitting MD who voiced understanding of the findings.  US OB 1st Trimester with Transvaginal:   IMPRESSION: No definite vascularized retained products of conception.  Heterogeneous material in the lower uterine segment likely retained  blood clot or nonvascularized products of conception.  Result per radiology.     Laboratory:  Hemoglobin: 9.6 (L)  Blood Component: Red Blood Cells, Leukocyte Reduced: Unit Number [id number]; Blood Product Code [id number]  ABO/Rh Type and Screen: O Positive,  Antibody Screen Negative  CBC: AWNL (WBC 6.6, HGB 12.0, )    Interventions:  2036: 0.9% Sodium Chloride Bolus 1000 ml IV  2113: 0.9% Sodium Chloride Bolus 1000 ml IV    Emergency Department Course:  Past medical records, nursing notes, and  vitals reviewed.  1927: I performed an exam of the patient and obtained history, as documented above.   IV inserted and blood drawn.  The patient was placed on continuous pulse oximetry and cardiac monitoring.  The patient was sent for a OB 1st Trimester with Transvaginal Ultrasound while in the emergency department, findings above.  2044: I rechecked the patient. Patient suddenly became hypotensive at 80/40. Increase in vaginal bleeding  2053: Patient moved to Stabilization Room #2. Blood transfusion ordered   2054: I discussed the case with Dr. Jazmyne Machado of OBGYN regarding the patient.  Findings and plan explained to the Patient who consents to admission.   2129: Blood products discontinued as patient on way to operating room    Impression & Plan      Medical Decision Making:  Chely Sharp is a 28-year-old female who presented to the emergency department with vaginal bleeding.  Patient with known miscarriage based on ultrasound last week.  Passed a fetus at home that I visualized based on her picture that she showed me.  Now with vaginal bleeding.  There was mild bleeding on initial exam but was not filling up the speculum and blood pressure was stable.  Rh was positive and hemoglobin was normal.  Ultrasound obtained that shows no obvious products of conception.  On repeat exam after ultrasound she dropped her blood pressure to 80/40 and almost had syncopal episode.  Patient soaked through 2 pads and her vaginal vault was filling up with blood.  Called OB who will bring her to the operating room for D&C.  Repeat hemoglobin obtained but in process. BP improved before transfer to OR.    Diagnosis:    ICD-10-CM    1. Incomplete miscarriage O03.4 ibuprofen (ADVIL/MOTRIN) 600 MG tablet     methylergonovine (METHERGINE) 0.2 MG tablet   2. S/P D&C (status post dilation and curettage) Z98.890 ibuprofen (ADVIL/MOTRIN) 600 MG tablet     methylergonovine (METHERGINE) 0.2 MG tablet     ferrous sulfate (FEROSUL)  325 (65 Fe) MG tablet   3. Acute blood loss anemia D62 ferrous sulfate (FEROSUL) 325 (65 Fe) MG tablet       Disposition:  Admitted to OR Floor under care of Dr. Jazmyne Machado of OBGYN      Shree Rodgers  3/11/2019    EMERGENCY DEPARTMENT  I, Shree Rodgers, am serving as a scribe at 7:27 PM on 3/11/2019 to document services personally performed by Lilly Peña MD based on my observations and the provider's statements to me.         Lilly Peña MD  03/12/19 0336

## 2019-03-13 LAB — COPATH REPORT: NORMAL

## 2019-03-15 LAB
BLD PROD TYP BPU: NORMAL
BLD UNIT ID BPU: 0
BLOOD PRODUCT CODE: NORMAL
BPU ID: NORMAL
TRANSFUSION STATUS PATIENT QL: NORMAL
TRANSFUSION STATUS PATIENT QL: NORMAL

## 2019-06-04 ENCOUNTER — OFFICE VISIT (OUTPATIENT)
Dept: URGENT CARE | Facility: URGENT CARE | Age: 29
End: 2019-06-04
Payer: COMMERCIAL

## 2019-06-04 VITALS
OXYGEN SATURATION: 99 % | RESPIRATION RATE: 16 BRPM | SYSTOLIC BLOOD PRESSURE: 98 MMHG | TEMPERATURE: 98.4 F | HEART RATE: 70 BPM | DIASTOLIC BLOOD PRESSURE: 72 MMHG

## 2019-06-04 DIAGNOSIS — S05.01XA BILATERAL CORNEAL ABRASIONS, INITIAL ENCOUNTER: Primary | ICD-10-CM

## 2019-06-04 DIAGNOSIS — S05.02XA BILATERAL CORNEAL ABRASIONS, INITIAL ENCOUNTER: Primary | ICD-10-CM

## 2019-06-04 PROCEDURE — 99213 OFFICE O/P EST LOW 20 MIN: CPT | Performed by: PHYSICIAN ASSISTANT

## 2019-06-04 RX ORDER — MOXIFLOXACIN 5 MG/ML
1 SOLUTION/ DROPS OPHTHALMIC 3 TIMES DAILY
Qty: 3 ML | Refills: 0 | Status: SHIPPED | OUTPATIENT
Start: 2019-06-04 | End: 2019-06-11
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
## 2019-06-05 NOTE — PATIENT INSTRUCTIONS
(S05.01XA,  S05.02XA) Bilateral corneal abrasions, initial encounter  (primary encounter diagnosis)  Comment: secondary to contact lenses wear AND possible dust from AC in car  Plan: moxifloxacin (VIGAMOX) 0.5 % ophthalmic         solution        Follow up with ophthalmology.  NO CONTACT LENS WEAR UNTIL THEREAFTER

## 2019-10-22 ENCOUNTER — ANCILLARY PROCEDURE (OUTPATIENT)
Dept: GENERAL RADIOLOGY | Facility: CLINIC | Age: 29
End: 2019-10-22
Attending: FAMILY MEDICINE
Payer: COMMERCIAL

## 2019-10-22 ENCOUNTER — OFFICE VISIT (OUTPATIENT)
Dept: URGENT CARE | Facility: URGENT CARE | Age: 29
End: 2019-10-22
Payer: COMMERCIAL

## 2019-10-22 VITALS
BODY MASS INDEX: 38.98 KG/M2 | RESPIRATION RATE: 20 BRPM | WEIGHT: 220 LBS | HEART RATE: 93 BPM | TEMPERATURE: 98 F | SYSTOLIC BLOOD PRESSURE: 100 MMHG | HEIGHT: 63 IN | OXYGEN SATURATION: 98 % | DIASTOLIC BLOOD PRESSURE: 63 MMHG

## 2019-10-22 DIAGNOSIS — R05.9 COUGH: ICD-10-CM

## 2019-10-22 DIAGNOSIS — R06.02 SOB (SHORTNESS OF BREATH): Primary | ICD-10-CM

## 2019-10-22 PROCEDURE — 94640 AIRWAY INHALATION TREATMENT: CPT | Performed by: FAMILY MEDICINE

## 2019-10-22 PROCEDURE — 71046 X-RAY EXAM CHEST 2 VIEWS: CPT

## 2019-10-22 PROCEDURE — 99214 OFFICE O/P EST MOD 30 MIN: CPT | Mod: 25 | Performed by: FAMILY MEDICINE

## 2019-10-22 RX ORDER — ALBUTEROL SULFATE 90 UG/1
2 AEROSOL, METERED RESPIRATORY (INHALATION) EVERY 6 HOURS
Qty: 1 INHALER | Refills: 0 | Status: SHIPPED | OUTPATIENT
Start: 2019-10-22 | End: 2022-01-08

## 2019-10-22 RX ORDER — PREDNISONE 20 MG/1
20 TABLET ORAL 2 TIMES DAILY
Qty: 10 TABLET | Refills: 0 | Status: SHIPPED | OUTPATIENT
Start: 2019-10-22 | End: 2019-10-27

## 2019-10-22 RX ORDER — ALBUTEROL SULFATE 0.83 MG/ML
2.5 SOLUTION RESPIRATORY (INHALATION) ONCE
Status: COMPLETED | OUTPATIENT
Start: 2019-10-22 | End: 2019-10-22

## 2019-10-22 RX ADMIN — ALBUTEROL SULFATE 2.5 MG: 0.83 SOLUTION RESPIRATORY (INHALATION) at 17:23

## 2019-10-22 ASSESSMENT — MIFFLIN-ST. JEOR: SCORE: 1682.04

## 2019-10-22 NOTE — PROGRESS NOTES
"SUBJECTIVE: Chely Sharp is a 29 year old female presenting with a chief complaint of nasal congestion and cough .  Onset of symptoms was 1 week(s) ago.  Course of illness is worsening.    Severity moderate  Current and Associated symptoms: stuffy nose and cough - non-productive  Treatment measures tried include Tylenol/Ibuprofen.  Predisposing factors include None.    No past medical history on file.  No Known Allergies  Social History     Tobacco Use     Smoking status: Never Smoker     Smokeless tobacco: Never Used   Substance Use Topics     Alcohol use: Yes     Comment: occ       ROS:  SKIN: no rash  GI: no vomiting    OBJECTIVE:  /63 (BP Location: Left arm, Patient Position: Sitting, Cuff Size: Adult Regular)   Pulse 93   Temp 98  F (36.7  C) (Oral)   Resp 20   Ht 1.6 m (5' 3\")   Wt 99.8 kg (220 lb)   SpO2 98%   BMI 38.97 kg/m  GENERAL APPEARANCE: healthy, alert and no distress  EYES: EOMI,  PERRL, conjunctiva clear  HENT: ear canals and TM's normal.  Nose and mouth without ulcers, erythema or lesions  NECK: supple, nontender, no lymphadenopathy  RESP: lungs clear to auscultation - no rales, rhonchi or wheezes  CV: regular rates and rhythm, normal S1 S2, no murmur noted  SKIN: no suspicious lesions or rashes    Xray without acute findings, no pneumonia read by Vega Valencia D.O.      ICD-10-CM    1. SOB (shortness of breath) R06.02 albuterol (PROVENTIL) neb solution 2.5 mg     INHALATION/NEBULIZER TREATMENT, INITIAL     XR Chest 2 Views     predniSONE (DELTASONE) 20 MG tablet     albuterol (PROAIR HFA) 108 (90 Base) MCG/ACT inhaler   2. Cough R05 albuterol (PROVENTIL) neb solution 2.5 mg     INHALATION/NEBULIZER TREATMENT, INITIAL     XR Chest 2 Views     predniSONE (DELTASONE) 20 MG tablet     albuterol (PROAIR HFA) 108 (90 Base) MCG/ACT inhaler     Felt better after neb and no coughing since  Fluids/Rest, f/u if worse/not any better    "

## 2020-02-13 ENCOUNTER — OFFICE VISIT (OUTPATIENT)
Dept: OBGYN | Facility: CLINIC | Age: 30
End: 2020-02-13
Payer: COMMERCIAL

## 2020-02-13 VITALS
WEIGHT: 217.9 LBS | DIASTOLIC BLOOD PRESSURE: 82 MMHG | SYSTOLIC BLOOD PRESSURE: 106 MMHG | BODY MASS INDEX: 38.6 KG/M2 | HEART RATE: 97 BPM

## 2020-02-13 DIAGNOSIS — N90.89 VULVAR MASS: ICD-10-CM

## 2020-02-13 DIAGNOSIS — Z12.4 SCREENING FOR CERVICAL CANCER: ICD-10-CM

## 2020-02-13 DIAGNOSIS — N92.6 IRREGULAR PERIODS: Primary | ICD-10-CM

## 2020-02-13 DIAGNOSIS — R42 VERTIGO: ICD-10-CM

## 2020-02-13 DIAGNOSIS — Z11.51 SCREENING FOR HUMAN PAPILLOMAVIRUS: ICD-10-CM

## 2020-02-13 PROCEDURE — G0145 SCR C/V CYTO,THINLAYER,RESCR: HCPCS | Performed by: OBSTETRICS & GYNECOLOGY

## 2020-02-13 PROCEDURE — 36415 COLL VENOUS BLD VENIPUNCTURE: CPT | Performed by: OBSTETRICS & GYNECOLOGY

## 2020-02-13 PROCEDURE — 88141 CYTOPATH C/V INTERPRET: CPT | Performed by: OBSTETRICS & GYNECOLOGY

## 2020-02-13 PROCEDURE — 84443 ASSAY THYROID STIM HORMONE: CPT | Performed by: OBSTETRICS & GYNECOLOGY

## 2020-02-13 PROCEDURE — 99213 OFFICE O/P EST LOW 20 MIN: CPT | Mod: 25 | Performed by: OBSTETRICS & GYNECOLOGY

## 2020-02-13 PROCEDURE — 99385 PREV VISIT NEW AGE 18-39: CPT | Performed by: OBSTETRICS & GYNECOLOGY

## 2020-02-13 PROCEDURE — 87624 HPV HI-RISK TYP POOLED RSLT: CPT | Performed by: OBSTETRICS & GYNECOLOGY

## 2020-02-13 NOTE — NURSING NOTE
"Chief Complaint   Patient presents with     Physical     Pap due  2019 AS-CUS       Initial /82 (BP Location: Right arm, Patient Position: Chair, Cuff Size: Adult Regular)   Pulse 97   Wt 98.8 kg (217 lb 14.4 oz)   LMP 2020 (Exact Date)   Breastfeeding No   BMI 38.60 kg/m   Estimated body mass index is 38.6 kg/m  as calculated from the following:    Height as of 10/22/19: 1.6 m (5' 3\").    Weight as of this encounter: 98.8 kg (217 lb 14.4 oz).  BP completed using cuff size: regular    Questioned patient about current smoking habits.  Pt. has never smoked.          The following HM Due: pap smear      Opal Fuller CMA on 2020 at 1:05 PM       "

## 2020-02-13 NOTE — PROGRESS NOTES
Chely is a 29 year old P2 (hx of  x 2)  female who presents for annual exam.     Besides routine health maintenance,  she would like to discuss irregular periods since having a miscarriage last year. Reports intervals span 14 days to 40 days. Durations of 4 days but heavier flow. Previous to her miscarriage she had 28 day interval periods. She also reports slight bloating prior to the onset of her periods.     She does admit to a lot of stress that she has been experiencing since the miscarriage partly due to coping with the loss of the pregnancy but also there does not appear to be a trigger for her stress. She admits to depression and anxiety as well. Denies ever being hospitalized for this or being treated medically for it in terms of anti-depressant medication use. Denies suicidal and/or homicidal ideation. She also reports a 19 lbs weigh gain over the last year.     She also reports being hospitalized for vertigo last year (2019) that she has not quite gotten over. Inquires about if there is a relation between her vertigo and her period irregularity.     She also wants to have an area of her vulva assess for a pellet sized mass that is nontender.     HPI:  The patient's PCP is [facility].        GYNECOLOGIC HISTORY:    Patient's last menstrual period was 2020 (exact date).    Refer above to menstrual pattern    Her current contraception method is: none.  She  reports that she has never smoked. She has never used smokeless tobacco.    Patient is sexually active.  STD testing offered?  Declined  Last PHQ-9 score on record = No flowsheet data found.  Last GAD7 score on record = No flowsheet data found.      HEALTH MAINTENANCE:  Cholesterol: (No results found for: CHOL   Last Mammo: Not applicable, Result: {plc   Pap: (No results found for: PAP )  ASCUS in 2019  Colonoscopy:  Not applicable, Result: Not applicable,     Health maintenance updated:  yes    HISTORY:  OB History     Para Term  AB Living   3 2 0 0 0 2   SAB TAB Ectopic Multiple Live Births   0 0 0 0 2      # Outcome Date GA Lbr Michael/2nd Weight Sex Delivery Anes PTL Lv   3             2 Para      -SEC   JOSE ANTONIO   1 Para      -SEC   JOSE ANTONIO       There is no problem list on file for this patient.    Past Surgical History:   Procedure Laterality Date     DILATION AND CURETTAGE SUCTION N/A 3/11/2019    Procedure: DILATION AND CURETTAGE SUCTION;  Surgeon: Jazmyne Machado MD;  Location: [facility] OR     GYN SURGERY        Social History     Tobacco Use     Smoking status: Never Smoker     Smokeless tobacco: Never Used   Substance Use Topics     Alcohol use: Yes     Comment: occ           Current Outpatient Medications   Medication Sig     albuterol (PROAIR HFA) 108 (90 Base) MCG/ACT inhaler Inhale 2 puffs into the lungs every 6 hours     ferrous sulfate (FEROSUL) 325 (65 Fe) MG tablet Take 1 tablet (325 mg) by mouth 2 times daily (Patient not taking: Reported on 2019)     ibuprofen (ADVIL/MOTRIN) 600 MG tablet Take 1 tablet (600 mg) by mouth every 6 hours as needed for other (mild and/or inflammatory pain) (Patient not taking: Reported on 2019)     methylergonovine (METHERGINE) 0.2 MG tablet Take 1 tablet (200 mcg) by mouth every 8 hours (Patient not taking: Reported on 2019)     No current facility-administered medications for this visit.      No Known Allergies    Past medical, surgical, social and family histories were reviewed and updated in EPIC.    ROS:   12 point review of systems negative other than symptoms noted below or in the HPI.  No urinary frequency or dysuria, bladder or kidney problems    EXAM:  /82 (BP Location: Right arm, Patient Position: Chair, Cuff Size: Adult Regular)   Pulse 97   Wt 98.8 kg (217 lb 14.4 oz)   LMP 2020 (Exact Date)   Breastfeeding No   BMI 38.60 kg/m     BMI: Body mass index is 38.6 kg/m .    PHYSICAL EXAM:  Constitutional:    Appearance: Well nourished, well developed, alert, in no acute distress  Neck:  Lymph Nodes:  No lymphadenopathy present    Thyroid:  Gland size normal, nontender, no nodules or masses present  on palpation  Chest:  Respiratory Effort:  Breathing unlabored  Cardiovascular:    Heart: Auscultation:  Regular rate, normal rhythm, no murmurs present  Breasts: Deferred.  Gastrointestinal:   Abdominal Examination:  Abdomen nontender to palpation, tone normal without rigidity or guarding, no masses present, umbilicus without lesions   Liver and Spleen:  No hepatomegaly present, liver nontender to palpation    Hernias:  No hernias present  Lymphatic: Lymph Nodes:  No other lymphadenopathy present  Skin:  General Inspection:  No rashes present, no lesions present, no areas of  discoloration  Neurologic:    Mental Status:  Oriented X3.  Normal strength and tone, sensory exam                grossly normal, mentation intact and speech normal.    Psychiatric:   Mentation appears normal and affect normal/bright.         Pelvic Exam:  External Genitalia:     Normal appearance for age, no discharge present, no tenderness present, no inflammatory lesions present, color normal. Along the left posterior labia majus is a pellet sized mass in the dermis that is non-tender.   Vagina:     Normal vaginal vault without central or paravaginal defects, no discharge present, no inflammatory lesions present, no masses present  Bladder:     Nontender to palpation  Urethra:   Urethral Body:  Urethra palpation normal, urethra structural support normal   Urethral Meatus:  No erythema or lesions present  Cervix:     Appearance healthy, no lesions present, nontender to palpation, no bleeding present  Uterus:     Uterus: firm, normal sized and nontender, anteverted in position.   Adnexa:     No adnexal tenderness present, no adnexal masses present  Perineum:     Perineum within normal limits, no evidence of trauma, no rashes or skin lesions  present  Anus:     Anus within normal limits, no hemorrhoids present  Inguinal Lymph Nodes:     No lymphadenopathy present  Pubic Hair:     Normal pubic hair distribution for age  Genitalia and Groin:     No rashes present, no lesions present, no areas of discoloration, no masses present      COUNSELING:   Reviewed preventive health counseling, as reflected in patient instructions    BMI: Body mass index is 38.6 kg/m .    ASSESSMENT:  29 year old female with satisfactory annual exam.    ICD-10-CM    1. Irregular periods N92.6 TSH with free T4 reflex   2. Vertigo R42 NEUROLOGY ADULT REFERRAL   3. Vulvar mass N90.89    4. Screening for human papillomavirus Z11.51 HPV High Risk Types DNA Cervical   5. Screening for cervical cancer Z12.4 PAP imaged thin layer, diagnostic       PLAN:  1) -- discussed possible etiologies for her irregular period stress, age and weight gain  -- thyroid function testing ordered to rule out endocrinopathy as a cause for her symptoms  -- endometrial sampling deferred given young age and shortened time of her symptoms  -- offered birth control therapy as treatment to help regulate her bleeding pattern; patient defers at this time given that it is not affecting her quality of life  -- offered non-medical recommendations such as weight loss and coping strategies for her anxiety and depression; she declines medical treatment of her anxiety and depression at this time     2) Vertigo  -- Neurology referral ordered  -- assured patient that there is no relation between her period irregularities and vertigo symptoms    3) Vulvar mass  -- likely a epidermal/dermal benign cyst  -- given that she is asymptomatic, patient elects to defer excision at this time despite being offered excision  -- precautions provided regarding increased size of mass or onset of pain, that she should return for evaluation    4) Pap smear collected    5) Pap smear collected    Total time spent was 45 minutes; greater than 50%  of time was spent in counseling and/or coordination of care for the above listed diagnoses, not including time spent on the procedure.      Leilani Rich MD

## 2020-02-14 LAB — TSH SERPL DL<=0.005 MIU/L-ACNC: 2.01 MU/L (ref 0.4–4)

## 2020-02-18 LAB
COPATH REPORT: ABNORMAL
PAP: ABNORMAL

## 2020-02-19 PROBLEM — R87.810 CERVICAL HIGH RISK HPV (HUMAN PAPILLOMAVIRUS) TEST POSITIVE: Status: ACTIVE | Noted: 2020-02-13

## 2020-02-19 PROBLEM — R87.611 PAP SMEAR OF CERVIX WITH ASCUS, CANNOT EXCLUDE HGSIL: Status: ACTIVE | Noted: 2020-02-13

## 2020-02-19 LAB
FINAL DIAGNOSIS: ABNORMAL
HPV HR 12 DNA CVX QL NAA+PROBE: NEGATIVE
HPV16 DNA SPEC QL NAA+PROBE: POSITIVE
HPV18 DNA SPEC QL NAA+PROBE: NEGATIVE
SPECIMEN DESCRIPTION: ABNORMAL
SPECIMEN SOURCE CVX/VAG CYTO: ABNORMAL

## 2020-03-12 ENCOUNTER — OFFICE VISIT (OUTPATIENT)
Dept: OBGYN | Facility: CLINIC | Age: 30
End: 2020-03-12
Payer: COMMERCIAL

## 2020-03-12 VITALS
BODY MASS INDEX: 38.94 KG/M2 | DIASTOLIC BLOOD PRESSURE: 82 MMHG | HEART RATE: 87 BPM | SYSTOLIC BLOOD PRESSURE: 130 MMHG | WEIGHT: 219.8 LBS

## 2020-03-12 DIAGNOSIS — R87.810 CERVICAL HIGH RISK HPV (HUMAN PAPILLOMAVIRUS) TEST POSITIVE: ICD-10-CM

## 2020-03-12 DIAGNOSIS — Z01.812 PRE-PROCEDURE LAB EXAM: ICD-10-CM

## 2020-03-12 DIAGNOSIS — R87.611 ATYPICAL SQUAMOUS CELLS CANNOT EXCLUDE HIGH GRADE SQUAMOUS INTRAEPITHELIAL LESION ON CYTOLOGIC SMEAR OF CERVIX (ASC-H): Primary | ICD-10-CM

## 2020-03-12 LAB — HCG, QUAL URINE: NEGATIVE

## 2020-03-12 PROCEDURE — 88305 TISSUE EXAM BY PATHOLOGIST: CPT | Performed by: OBSTETRICS & GYNECOLOGY

## 2020-03-12 PROCEDURE — 84703 CHORIONIC GONADOTROPIN ASSAY: CPT | Performed by: OBSTETRICS & GYNECOLOGY

## 2020-03-12 PROCEDURE — 57454 BX/CURETT OF CERVIX W/SCOPE: CPT | Performed by: OBSTETRICS & GYNECOLOGY

## 2020-03-12 NOTE — PROGRESS NOTES
Chely Sharp is a 29 year old female P2 ( x 2)  who presents for abnormal pap smear evaluation.     Pap smear hx is as follows:   06 HSIL pap @ age 16  07 NIL pap, neg HR HPV  7/10/08 NIL pap  09 NIL pap, neg HR HPV.  7/30/10 NIL pap  12 NIL pap  16 NIL pap  19 ASCUS pap, neg HR HPV @ age 28   [Above per Care Everywhere]  20 ASC-H pap, + HR HPV 16. Plan colp      This will be her initial colposcopy.    Patient's last menstrual period was 2020 (approximate).   UPT today is negative      Patient does not smoke    Type of contraception: none  Age at first sexual intercourse: 14  Number of sexual partners (lifetime): 3  Past GYN history:  No STD history  Prior cervical/vaginal disease: Normal exam without visible pathology.  Prior cervical treatment: no treatment.      PROCEDURE:  Before the procedure, it was ensured that the patient was educated regarding the nature of her findings to date, the implications, and what was to be done. She has been made aware of the role of HPV, the natural history of infection, ways to minimize her future risk, the effect of HPV on the cervix, and treatment options available should they be indicated.  The details of the colposcopic procedure were reviewed.  All questions were answered before proceeding, and informed consent was therefore obtained.    Speculum placed in vagina and excellent visualization of cervix   acheived, cervix swabbed x 3 with acetic acid solution.    FINDINGS:  EXAM:  Blood pressure 130/82, pulse 87, weight 99.7 kg (219 lb 12.8 oz), last menstrual period 2020, not currently breastfeeding.  BMI= Body mass index is 38.94 kg/m .  Patient's last menstrual period was 2020 (approximate).  General - pleasant female in no acute distress.  Neurological - alert and oriented X 3  Psychiatric - normal mood and affect    Pelvic-  Cervix: acetowhitening noted 12:00; biopsy taken (1)   Please refer to images section  for details.    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    PLAN: specimens labelled and sent to Pathology, will base further treatment on Pathology findings, post biopsy instructions given to patient and call to discuss Pathology results    A copy of the chart will be sent to the referring provider.    Leilani Rich MD  [facility]

## 2020-03-12 NOTE — NURSING NOTE
"Chief Complaint   Patient presents with     Colposcopy     ASC-H  HPV POS 16        Initial /82 (BP Location: Left arm, Patient Position: Chair, Cuff Size: Adult Large)   Pulse 87   Wt 99.7 kg (219 lb 12.8 oz)   LMP 2020 (Approximate)   Breastfeeding No   BMI 38.94 kg/m   Estimated body mass index is 38.94 kg/m  as calculated from the following:    Height as of 10/22/19: 1.6 m (5' 3\").    Weight as of this encounter: 99.7 kg (219 lb 12.8 oz).  BP completed using cuff size: large    Questioned patient about current smoking habits.  Pt. has never smoked.          The following HM Due: NONE      Opal Fuller, LEAH on 3/12/2020 at 9:06 AM       "

## 2020-03-16 LAB — COPATH REPORT: NORMAL

## 2020-03-17 NOTE — RESULT ENCOUNTER NOTE
Inform patient that her colposcopy showed high grade abnormality. She will need to schedule for a LEEP procedure to be done as soon as possible.     Leilani Rich MD

## 2020-03-26 ENCOUNTER — OFFICE VISIT (OUTPATIENT)
Dept: OBGYN | Facility: CLINIC | Age: 30
End: 2020-03-26
Payer: COMMERCIAL

## 2020-03-26 VITALS
WEIGHT: 223 LBS | BODY MASS INDEX: 39.5 KG/M2 | DIASTOLIC BLOOD PRESSURE: 82 MMHG | SYSTOLIC BLOOD PRESSURE: 128 MMHG | HEART RATE: 91 BPM

## 2020-03-26 DIAGNOSIS — Z01.812 PRE-PROCEDURE LAB EXAM: ICD-10-CM

## 2020-03-26 DIAGNOSIS — D06.1 CARCINOMA IN SITU OF EXOCERVIX: Primary | ICD-10-CM

## 2020-03-26 LAB — HCG, QUAL URINE: NORMAL

## 2020-03-26 PROCEDURE — 88307 TISSUE EXAM BY PATHOLOGIST: CPT | Performed by: OBSTETRICS & GYNECOLOGY

## 2020-03-26 PROCEDURE — 84703 CHORIONIC GONADOTROPIN ASSAY: CPT | Performed by: OBSTETRICS & GYNECOLOGY

## 2020-03-26 PROCEDURE — 57460 BX OF CERVIX W/SCOPE LEEP: CPT | Performed by: OBSTETRICS & GYNECOLOGY

## 2020-03-26 NOTE — NURSING NOTE
"Chief Complaint   Patient presents with     Leep     [place] 3/12/2020   MELLISSA  2-3        Initial /82 (BP Location: Left arm, Patient Position: Chair, Cuff Size: Adult Large)   Pulse 91   Wt 101.2 kg (223 lb)   LMP 2020 (Approximate)   Breastfeeding No   BMI 39.50 kg/m   Estimated body mass index is 39.5 kg/m  as calculated from the following:    Height as of 10/22/19: 1.6 m (5' 3\").    Weight as of this encounter: 101.2 kg (223 lb).  BP completed using cuff size: large    Questioned patient about current smoking habits.  Pt. has never smoked.          The following HM Due: NONE      Opal Fuller CMA on 3/26/2020 at 9:01 AM         Leep :      5 ml 1% lido no EPI   LOT : [id number]  EXP: 3/2021     5ml  2% lido w/EPI   LOT: [id number]  EXP: 2021       Opal Fuller CMA on 3/26/2020 at 11:10 AM    "

## 2020-03-26 NOTE — PROGRESS NOTES
Chely Sharp is a 29 year old female who presents with MELLISSA 2-3 on cervical ectocervix biopsy on her colposcopy. Endocervical curetting was negative. She is here for a LEEP procedure    No complaints at this time.     Patient's last menstrual period was 03/03/2020 (approximate).    UPT was negative.  We reviewed the risks and benefits of a LEEP procedure.  She had received and reviewed the pamphlet re:LEEP  Consent was signed  She was taken to the procedure room.  She was placed in the lithotomy position.  A coated Graves speculum was placed in the vagina, isolating the cervix.  A para-cervical block was placed w/ 1% Lidocaine w/ epi.  A 20 mm by 15 mm Loop was used, however there was difficulty in effectively excising the central cervix due to either lack of adequate energy cautery or flimsy loop; multiple attempts at changing loops and turning off an on the machine were done to help trouble shoot this problem, but this proved unsuccessful. Finally, long handled curved Metzenbaum scissors were used to excision the central cervix.   A stitch was placed @ 12:00 and the specimen was submitted.  Hemostasis was ensured w/ ball tip cautery.  Monsel's solution was applied.  Chely tolerated the procedure well.    A/P: S/p LEEP; hx of MELLISSA 2-3 on colposcopy  -- Post LEEP precautions were discussed.  -- bleeding precautions reviewed    Leilani Rich MD  [facility]

## 2020-03-27 LAB — COPATH REPORT: NORMAL

## 2020-03-27 NOTE — RESULT ENCOUNTER NOTE
Inform patient that her LEEP biopsy revealed the same high grade abnormality that was seen on her colposcopy. The good news is that the LEEP biopsy removed had all the high grade abnormalities within the specimen as it did not extend to the margins of the biopsy. She is to return in 1 year for cotesting Pap smear.     Leilani Rich MD

## 2020-03-30 DIAGNOSIS — N89.8 VAGINAL ODOR: Primary | ICD-10-CM

## 2020-03-30 DIAGNOSIS — Z98.890 H/O LEEP: ICD-10-CM

## 2020-03-30 RX ORDER — METRONIDAZOLE 500 MG/1
500 TABLET ORAL 2 TIMES DAILY
Qty: 14 TABLET | Refills: 0 | Status: SHIPPED | OUTPATIENT
Start: 2020-03-30 | End: 2020-07-24

## 2020-03-30 NOTE — RESULT ENCOUNTER NOTE
In regards to her foul smelling discharge, it is possible that it could be associated with an infection of her cervix. Therefore, I will prescribe her antibiotic (flagyl) to help treat the infection and get rid of the foul smelling odor.   In regards to her back pain, I dont think that it would have anything to do with the LEEP and could be a symptom that is unrelated to the LEEP. Its reassuring that it was able to go away with conservative therapy.   I will prescribe her the antibiotics to her pharmacy. Again, she is encouraged to have pelvic rest for 6 weeks.     Leilani Rich MD

## 2020-05-20 ENCOUNTER — HOSPITAL ENCOUNTER (EMERGENCY)
Facility: CLINIC | Age: 30
Discharge: HOME | End: 2020-05-21
Attending: EMERGENCY MEDICINE | Admitting: EMERGENCY MEDICINE
Payer: COMMERCIAL

## 2020-05-20 ENCOUNTER — APPOINTMENT (OUTPATIENT)
Dept: GENERAL RADIOLOGY | Facility: CLINIC | Age: 30
End: 2020-05-20
Attending: EMERGENCY MEDICINE
Payer: COMMERCIAL

## 2020-05-20 DIAGNOSIS — R07.9 ACUTE CHEST PAIN: ICD-10-CM

## 2020-05-20 DIAGNOSIS — R06.02 SHORTNESS OF BREATH: ICD-10-CM

## 2020-05-20 DIAGNOSIS — M79.602 PAIN OF LEFT UPPER EXTREMITY: ICD-10-CM

## 2020-05-20 DIAGNOSIS — E87.6 HYPOKALEMIA: ICD-10-CM

## 2020-05-20 LAB
ALBUMIN SERPL-MCNC: 4.1 G/DL (ref 3.4–5)
ALP SERPL-CCNC: 108 U/L (ref 40–150)
ALT SERPL W P-5'-P-CCNC: 32 U/L (ref 0–50)
ANION GAP SERPL CALCULATED.3IONS-SCNC: 6 MMOL/L (ref 3–14)
AST SERPL W P-5'-P-CCNC: 23 U/L (ref 0–45)
BASOPHILS # BLD AUTO: 0 10E9/L (ref 0–0.2)
BASOPHILS NFR BLD AUTO: 0.2 %
BILIRUB SERPL-MCNC: 0.3 MG/DL (ref 0.2–1.3)
BUN SERPL-MCNC: 22 MG/DL (ref 7–30)
CALCIUM SERPL-MCNC: 8.7 MG/DL (ref 8.5–10.1)
CHLORIDE SERPL-SCNC: 105 MMOL/L (ref 94–109)
CO2 SERPL-SCNC: 27 MMOL/L (ref 20–32)
CREAT SERPL-MCNC: 0.66 MG/DL (ref 0.52–1.04)
DIFFERENTIAL METHOD BLD: NORMAL
EOSINOPHIL # BLD AUTO: 0.2 10E9/L (ref 0–0.7)
EOSINOPHIL NFR BLD AUTO: 1.6 %
ERYTHROCYTE [DISTWIDTH] IN BLOOD BY AUTOMATED COUNT: 13.1 % (ref 10–15)
GFR SERPL CREATININE-BSD FRML MDRD: >90 ML/MIN/{1.73_M2}
GLUCOSE SERPL-MCNC: 107 MG/DL (ref 70–99)
HCG SERPL QL: NEGATIVE
HCT VFR BLD AUTO: 38 % (ref 35–47)
HGB BLD-MCNC: 12.5 G/DL (ref 11.7–15.7)
IMM GRANULOCYTES # BLD: 0 10E9/L (ref 0–0.4)
IMM GRANULOCYTES NFR BLD: 0.2 %
LYMPHOCYTES # BLD AUTO: 2.3 10E9/L (ref 0.8–5.3)
LYMPHOCYTES NFR BLD AUTO: 24.5 %
MCH RBC QN AUTO: 28.7 PG (ref 26.5–33)
MCHC RBC AUTO-ENTMCNC: 32.9 G/DL (ref 31.5–36.5)
MCV RBC AUTO: 87 FL (ref 78–100)
MONOCYTES # BLD AUTO: 0.4 10E9/L (ref 0–1.3)
MONOCYTES NFR BLD AUTO: 4.4 %
NEUTROPHILS # BLD AUTO: 6.6 10E9/L (ref 1.6–8.3)
NEUTROPHILS NFR BLD AUTO: 69.1 %
NRBC # BLD AUTO: 0 10*3/UL
NRBC BLD AUTO-RTO: 0 /100
PLATELET # BLD AUTO: 323 10E9/L (ref 150–450)
POTASSIUM SERPL-SCNC: 3.2 MMOL/L (ref 3.4–5.3)
PROT SERPL-MCNC: 8.7 G/DL (ref 6.8–8.8)
RBC # BLD AUTO: 4.35 10E12/L (ref 3.8–5.2)
SODIUM SERPL-SCNC: 138 MMOL/L (ref 133–144)
TROPONIN I SERPL-MCNC: <0.015 UG/L (ref 0–0.04)
WBC # BLD AUTO: 9.6 10E9/L (ref 4–11)

## 2020-05-20 PROCEDURE — 99285 EMERGENCY DEPT VISIT HI MDM: CPT | Mod: 25

## 2020-05-20 PROCEDURE — 93005 ELECTROCARDIOGRAM TRACING: CPT

## 2020-05-20 PROCEDURE — 84484 ASSAY OF TROPONIN QUANT: CPT | Performed by: EMERGENCY MEDICINE

## 2020-05-20 PROCEDURE — 80053 COMPREHEN METABOLIC PANEL: CPT | Performed by: EMERGENCY MEDICINE

## 2020-05-20 PROCEDURE — 85379 FIBRIN DEGRADATION QUANT: CPT | Performed by: EMERGENCY MEDICINE

## 2020-05-20 PROCEDURE — 84703 CHORIONIC GONADOTROPIN ASSAY: CPT | Performed by: EMERGENCY MEDICINE

## 2020-05-20 PROCEDURE — 71045 X-RAY EXAM CHEST 1 VIEW: CPT

## 2020-05-20 PROCEDURE — 25000132 ZZH RX MED GY IP 250 OP 250 PS 637: Performed by: EMERGENCY MEDICINE

## 2020-05-20 PROCEDURE — 85025 COMPLETE CBC W/AUTO DIFF WBC: CPT | Performed by: EMERGENCY MEDICINE

## 2020-05-20 PROCEDURE — 83735 ASSAY OF MAGNESIUM: CPT | Performed by: EMERGENCY MEDICINE

## 2020-05-20 RX ORDER — ASPIRIN 81 MG/1
324 TABLET, CHEWABLE ORAL ONCE
Status: COMPLETED | OUTPATIENT
Start: 2020-05-20 | End: 2020-05-20

## 2020-05-20 RX ADMIN — ASPIRIN 81 MG 324 MG: 81 TABLET ORAL at 23:52

## 2020-05-20 NOTE — ED AVS SNAPSHOT
Emergency Department  [address] [facility] [address]  Phone:  [phone]  Fax:  [phone]                                    Chely Sharp   MRN: [id number]    Department:   Emergency Department   Date of Visit:  5/20/2020           After Visit Summary Signature Page    I have received my discharge instructions, and my questions have been answered. I have discussed any challenges I see with this plan with the nurse or doctor.    ..........................................................................................................................................  Patient/Patient Representative Signature      ..........................................................................................................................................  Patient Representative Print Name and Relationship to Patient    ..................................................               ................................................  Date                                   Time    ..........................................................................................................................................  Reviewed by Signature/Title    ...................................................              ..............................................  Date                                               Time          22EPIC Rev 08/18

## 2020-05-21 VITALS
OXYGEN SATURATION: 98 % | HEART RATE: 87 BPM | DIASTOLIC BLOOD PRESSURE: 64 MMHG | SYSTOLIC BLOOD PRESSURE: 103 MMHG | RESPIRATION RATE: 17 BRPM

## 2020-05-21 LAB
D DIMER PPP FEU-MCNC: <0.3 UG/ML FEU (ref 0–0.5)
MAGNESIUM SERPL-MCNC: 2.2 MG/DL (ref 1.6–2.3)

## 2020-05-21 RX ORDER — POTASSIUM CHLORIDE 1500 MG/1
40 TABLET, EXTENDED RELEASE ORAL ONCE
Status: DISCONTINUED | OUTPATIENT
Start: 2020-05-21 | End: 2020-05-21 | Stop reason: HOSPADM

## 2020-05-21 ASSESSMENT — ENCOUNTER SYMPTOMS
DIARRHEA: 1
COUGH: 0
ABDOMINAL PAIN: 0
NAUSEA: 1
SHORTNESS OF BREATH: 1
FEVER: 0
VOMITING: 0

## 2020-05-21 NOTE — DISCHARGE INSTRUCTIONS
*You may resume diet and activities as tolerated.  *No new medications. Continue your current medications.    *Follow-up with your doctor for a recheck in 2-3 days.    *Return if you develop difficulty in breathing, faint or feel like you will faint or become worse in any way.    Discharge Instructions  Chest Pain    You have been seen today for chest pain or discomfort.  At this time, your doctor has found no signs that your chest pain is due to a serious or life-threatening condition, (or you have declined more testing and/or admission to the hospital). However, sometimes there is a serious problem that does not show up right away. Your evaluation today may not be complete and you may need further testing and evaluation.     You need to follow-up with your regular doctor within 3 days.    Return to the Emergency Department if:  Your chest pain changes, gets worse, starts to happen more often, or comes with less activity.  You are short of breath.  You get very weak or tired.  You pass out or faint.  You have any new symptoms, like fever, cough, numb legs, or you cough up blood.  You have anything else that worries you.    Until you follow-up with your regular doctor please do the following:  Take one aspirin daily unless you have an allergy or are told not to by your doctor.  If a stress test appointment has been made, go to the appointment.  If you have questions, contact your regular doctor.    If your doctor today has told you to follow-up with your regular doctor, it is very important that you make an appointment with your clinic and go to the appointment.  If you do not follow-up with your primary doctor, it may result in missing an important development which could result in permanent injury or disability and/or lasting pain.  If there is any problem keeping your appointment, call your doctor or return to the Emergency Department.    If you were given a prescription for medicine here today, be sure to read  all of the information (including the package insert) that comes with your prescription.  This will include important information about the medicine, its side effects, and any warnings that you need to know about.  The pharmacist who fills the prescription can provide more information and answer questions you may have about the medicine.  If you have questions or concerns that the pharmacist cannot address, please call or return to the Emergency Department.     Opioid Medication Information    Pain medications are among the most commonly prescribed medicines, so we are including this information for all our patients. If you did not receive pain medication or get a prescription for pain medicine, you can ignore it.     You may have been given a prescription for an opioid (narcotic) pain medicine and/or have received a pain medicine while here in the Emergency Department. These medicines can make you drowsy or impaired. You must not drive, operate dangerous equipment, or engage in any other dangerous activities while taking these medications. If you drive while taking these medications, you could be arrested for DUI, or driving under the influence. Do not drink any alcohol while you are taking these medications.     Opioid pain medications can cause addiction. If you have a history of chemical dependency of any type, you are at a higher risk of becoming addicted to pain medications.  Only take these prescribed medications to treat your pain when all other options have been tried. Take it for as short a time and as few doses as possible. Store your pain pills in a secure place, as they are frequently stolen and provide a dangerous opportunity for children or visitors in your house to start abusing these powerful medications. We will not replace any lost or stolen medicine.  As soon as your pain is better, you should flush all your remaining medication.     Many prescription pain medications contain Tylenol   (acetaminophen), including Vicodin , Tylenol #3 , Norco , Lortab , and Percocet .  You should not take any extra pills of Tylenol  if you are using these prescription medications or you can get very sick.  Do not ever take more than 3000 mg of acetaminophen in any 24 hour period.    All opioids tend to cause constipation. Drink plenty of water and eat foods that have a lot of fiber, such as fruits, vegetables, prune juice, apple juice and high fiber cereal.  Take a laxative if you don t move your bowels at least every other day. Miralax , Milk of Magnesia, Colace , or Senna  can be used to keep you regular.      Remember that you can always come back to the Emergency Department if you are not able to see your regular doctor in the amount of time listed above, if you get any new symptoms, or if there is anything that worries you.

## 2020-05-21 NOTE — ED PROVIDER NOTES
History   Chief Complaint:  Chest Pain, Shortness of Breath, Diarrhea    HPI   Chely Sharp is a 29 year old female, s/p LEEP on 3/26, who presents to the ED for evaluation of chest pain, shortness of breath, and diarrhea. The patient reports the onset of intermittent chest pain beginning four days ago. The patient states the pain will cause some left arm pain and make her feel short of breath. The intermittent chest pain episodes will last for an hour. She does not currently have chest pain here in the emergency department. She mentions the chest pain will subside before the left arm pain will. The last episode was about five hours ago. She does convey that she has been having some diarrhea episodes, but says this could be because she is nervious. She endorses some nausea, but no vomiting or diaphoresis. The patient also states she has been having some pain behind her left knee, which has been happening since her LEEP procedure intermittently. She denies any exposure to ill individuals or people with COVID like symptoms. She also denies any fever, cough, abdominal pain, leg swelling, or any other acute symptoms.      CARDIAC RISK FACTORS:  Sex:    Female  Tobacco:   No  Hypertension:   No  Hyperlipidemia:  No  Diabetes:   No  Family History:  No    PE/DVT RISK FACTORS:  Sex:    Female  Hormones:   No  Tobacco:   No  Cancer:   No  Travel:   No  Surgery:   No  Other immobilization: No  Personal history:  No  Family history:  No    Covid-19  Chely Sharp was evaluated during a global COVID-19 pandemic, which necessitated consideration that the patient might be at risk for infection with the SARS-CoV-2 virus that causes COVID-19.   Applicable protocols for evaluation were followed during the patient's care.   COVID-19 was considered as part of the patient's evaluation.    Allergies:  No known drug allergies    Medications:    Albuterol  Methergine    Past Medical History:    Postpartum  depression  Varicella    Past Surgical History:    LEEP  D & C   section    Family History:    Asthma    Social History:  Smoking status: Never  Alcohol use: Yes  Drug use: No  PCP: [facility]  Marital Status:   [2]    Review of Systems   Constitutional: Negative for fever.   Respiratory: Positive for shortness of breath. Negative for cough.    Cardiovascular: Positive for chest pain. Negative for leg swelling.   Gastrointestinal: Positive for diarrhea and nausea. Negative for abdominal pain and vomiting.   All other systems reviewed and are negative.    Physical Exam     Patient Vitals for the past 24 hrs:   BP Pulse Heart Rate Resp SpO2   20 2353 -- -- -- 12 99 %   20 2352 -- -- 98 -- --   20 2344 -- -- 94 -- 99 %   20 2343 -- -- -- 17 --   20 2329 111/74 95 -- -- 99 %   20 2301 135/76 102 -- -- 96 %       Physical Exam  General: Well-nourished, appears to be uncomfortable when I enter the room  Eyes: PERRL, conjunctivae pink no scleral icterus or conjunctival injection  ENT:  Moist mucus membranes  Respiratory:  Normal work of breathing. Speaking in complete sentences. No accessory muscle use.  No cough  CV: Normal rate, regular pulse  GI:  Abdomen soft and non-distended.  No tenderness, guarding or rebound  Skin: Warm, dry.  No rashes or petechiae  Musculoskeletal: No peripheral edema or calf tenderness  Neuro: Alert and oriented to person/place/time.  Neck supple.  Psychiatric: Normal affect      Emergency Department Course   ECG (23:28:03):  Rate 92 bpm. CT interval 128. QRS duration 8. QT/QTc 368/455. P-R-T axes 48 47 28.  Normal sinus rhythm. Normal ECG. Interpreted at 2336 by Davina Vazquez MD.    Imaging:  Radiology findings were communicated with the patient who voiced understanding of the findings.    XR Chest, portable, 1 view:  Negative chest.     Imaging independently reviewed and agree with radiologist interpretation.       Laboratory:  Laboratory findings were communicated with the patient who voiced understanding of the findings.    CBC: WNL (WBC 9.6, HGB 12.5, )    CMP: K 3.2 (L),  (H), o/w WNL (Creatinine 0.66)    Troponin: <0.015    D-dimer: <0.3    Magnesium: 2.2    HCG Qualitative: Negative     Interventions:  2352: Aspirin 324 mg PO    Emergency Department Course:  Past medical records, nursing notes, and vitals reviewed.    2316 I performed an exam of the patient as documented above.     EKG obtained in the ED, see results above.   IV was inserted and blood was drawn for laboratory testing, results above.  The patient was sent for a chest x-ray while in the emergency department, results above.     0030 I rechecked the patient and discussed the results of her workup thus far.     Findings and plan explained to the Patient. Patient discharged home with instructions regarding supportive care, medications, and reasons to return. The importance of close follow-up was reviewed.    I personally reviewed the laboratory and imaging results with the Patient and answered all related questions prior to discharge.     Impression & Plan   Medical Decision Making:    HEART Score  Criteria   0-2 points for each of 5 items (maximum of 10 points):  Score 0- History slightly suspicious for coronary syndrome  Score 0- EKG Normal  Score 0- Age <45 years old  Score 0- No risk factors for atherosclerotic disease  Score 0- Within normal limits for troponin levels  Interpretation  Risk of adverse outcome  Heart Score: 0  Total Score 0-3- Adverse Outcome Risk 2.5% - Supports early discharge with appropriate follow-up    Chely Sharp is a 29 year old female presented with chest pain. Initial laboratory and imaging tests have come back normal.  Troponin is negative despite having symptoms for many days.  She may have a cervical radiculopathy but no neurologic deficits on examination.  Chest x-ray is clear without signs of mediastinal  widening.  No pneumonia or pneumothorax.  No respiratory distress or hypoxia.  No tachycardia.  Pulses are equal.  She has some diarrhea but her abdomen is benign.  She really has no other symptoms or any high risk exposures that are concerning for COVID and I do not believe she requires testing at this time.  D-dimer is negative and she is otherwise low risk with low pretest probability for pulmonary embolism..  Potassium was slightly low and was repleted.  She was given instructions on high potassium diet.  Given the low HEART score, I feel the candidate is appropriate for discharge with outpatient stress testing.  she has agreed to follow-up with the stress test and primary doctor and return if any worsening.      Diagnosis:    ICD-10-CM    1. Acute chest pain  R07.9    2. Pain of left upper extremity  M79.602    3. Shortness of breath  R06.02    4. Hypokalemia  E87.6        Disposition:  Discharged to home.    Scribe Disclosure:  IAnthony, am serving as a scribe at 11:16 PM on 5/20/2020 to document services personally performed by Davina Vazquez MD based on my observations and the provider's statements to me.      Davina Vazquez MD  05/21/20 0052

## 2020-05-22 LAB — INTERPRETATION ECG - MUSE: NORMAL

## 2020-07-24 ENCOUNTER — HOSPITAL ENCOUNTER (EMERGENCY)
Facility: CLINIC | Age: 30
Discharge: HOME | End: 2020-07-24
Attending: EMERGENCY MEDICINE | Admitting: EMERGENCY MEDICINE
Payer: COMMERCIAL

## 2020-07-24 ENCOUNTER — APPOINTMENT (OUTPATIENT)
Dept: GENERAL RADIOLOGY | Facility: CLINIC | Age: 30
End: 2020-07-24
Attending: EMERGENCY MEDICINE
Payer: COMMERCIAL

## 2020-07-24 VITALS
OXYGEN SATURATION: 99 % | SYSTOLIC BLOOD PRESSURE: 103 MMHG | TEMPERATURE: 97.6 F | RESPIRATION RATE: 18 BRPM | WEIGHT: 222 LBS | HEART RATE: 70 BPM | DIASTOLIC BLOOD PRESSURE: 67 MMHG | HEIGHT: 63 IN | BODY MASS INDEX: 39.34 KG/M2

## 2020-07-24 DIAGNOSIS — R06.02 SHORTNESS OF BREATH: ICD-10-CM

## 2020-07-24 LAB
ANION GAP SERPL CALCULATED.3IONS-SCNC: 5 MMOL/L (ref 3–14)
BASOPHILS # BLD AUTO: 0 10E9/L (ref 0–0.2)
BASOPHILS NFR BLD AUTO: 0.3 %
BUN SERPL-MCNC: 12 MG/DL (ref 7–30)
CALCIUM SERPL-MCNC: 8.1 MG/DL (ref 8.5–10.1)
CHLORIDE SERPL-SCNC: 105 MMOL/L (ref 94–109)
CO2 SERPL-SCNC: 27 MMOL/L (ref 20–32)
CREAT SERPL-MCNC: 0.61 MG/DL (ref 0.52–1.04)
DIFFERENTIAL METHOD BLD: NORMAL
EOSINOPHIL # BLD AUTO: 0.2 10E9/L (ref 0–0.7)
EOSINOPHIL NFR BLD AUTO: 2.2 %
ERYTHROCYTE [DISTWIDTH] IN BLOOD BY AUTOMATED COUNT: 13.3 % (ref 10–15)
GFR SERPL CREATININE-BSD FRML MDRD: >90 ML/MIN/{1.73_M2}
GLUCOSE SERPL-MCNC: 103 MG/DL (ref 70–99)
HCT VFR BLD AUTO: 36.3 % (ref 35–47)
HGB BLD-MCNC: 11.7 G/DL (ref 11.7–15.7)
IMM GRANULOCYTES # BLD: 0 10E9/L (ref 0–0.4)
IMM GRANULOCYTES NFR BLD: 0.3 %
LYMPHOCYTES # BLD AUTO: 1.9 10E9/L (ref 0.8–5.3)
LYMPHOCYTES NFR BLD AUTO: 25.3 %
MCH RBC QN AUTO: 28.5 PG (ref 26.5–33)
MCHC RBC AUTO-ENTMCNC: 32.2 G/DL (ref 31.5–36.5)
MCV RBC AUTO: 88 FL (ref 78–100)
MONOCYTES # BLD AUTO: 0.4 10E9/L (ref 0–1.3)
MONOCYTES NFR BLD AUTO: 5 %
NEUTROPHILS # BLD AUTO: 4.9 10E9/L (ref 1.6–8.3)
NEUTROPHILS NFR BLD AUTO: 66.9 %
NRBC # BLD AUTO: 0 10*3/UL
NRBC BLD AUTO-RTO: 0 /100
PLATELET # BLD AUTO: 304 10E9/L (ref 150–450)
POTASSIUM SERPL-SCNC: 3.7 MMOL/L (ref 3.4–5.3)
RBC # BLD AUTO: 4.11 10E12/L (ref 3.8–5.2)
SODIUM SERPL-SCNC: 137 MMOL/L (ref 133–144)
TROPONIN I SERPL-MCNC: <0.015 UG/L (ref 0–0.04)
WBC # BLD AUTO: 7.4 10E9/L (ref 4–11)

## 2020-07-24 PROCEDURE — 71045 X-RAY EXAM CHEST 1 VIEW: CPT

## 2020-07-24 PROCEDURE — 84484 ASSAY OF TROPONIN QUANT: CPT | Performed by: EMERGENCY MEDICINE

## 2020-07-24 PROCEDURE — C9803 HOPD COVID-19 SPEC COLLECT: HCPCS

## 2020-07-24 PROCEDURE — U0003 INFECTIOUS AGENT DETECTION BY NUCLEIC ACID (DNA OR RNA); SEVERE ACUTE RESPIRATORY SYNDROME CORONAVIRUS 2 (SARS-COV-2) (CORONAVIRUS DISEASE [COVID-19]), AMPLIFIED PROBE TECHNIQUE, MAKING USE OF HIGH THROUGHPUT TECHNOLOGIES AS DESCRIBED BY CMS-2020-01-R: HCPCS | Performed by: EMERGENCY MEDICINE

## 2020-07-24 PROCEDURE — 80048 BASIC METABOLIC PNL TOTAL CA: CPT | Performed by: EMERGENCY MEDICINE

## 2020-07-24 PROCEDURE — 85025 COMPLETE CBC W/AUTO DIFF WBC: CPT | Performed by: EMERGENCY MEDICINE

## 2020-07-24 PROCEDURE — 99284 EMERGENCY DEPT VISIT MOD MDM: CPT

## 2020-07-24 RX ORDER — PANTOPRAZOLE SODIUM 40 MG/1
40 TABLET, DELAYED RELEASE ORAL DAILY
Qty: 30 TABLET | Refills: 0 | Status: SHIPPED | OUTPATIENT
Start: 2020-07-24 | End: 2020-08-23

## 2020-07-24 ASSESSMENT — ENCOUNTER SYMPTOMS
COUGH: 1
SORE THROAT: 1
CHEST TIGHTNESS: 1
SHORTNESS OF BREATH: 1

## 2020-07-24 ASSESSMENT — MIFFLIN-ST. JEOR: SCORE: 1686.12

## 2020-07-24 NOTE — ED NOTES
Patient waiting quietly in lobby. Respirations remain regular and unlabored. Patient ambulated to lobby without worsening of symptoms. Patient at RN desk requests to leave.

## 2020-07-24 NOTE — ED PROVIDER NOTES
History     Chief Complaint:  Shortness of breath      HPI   Chely Sharp is a 30 year old female who presents to the emergency department for evaluation of shortness of breath. The patient reports that tonight she was sleeping next to her  when she suddenly jumped up and went to the bathroom where she began saying she was short of breath. This has happened to the patient before, but she states that tonight she does not remember jumping up and her first recollection is talking with her  in the bathroom. The patient reports that she has been having trouble breathing and chest pressure since May of this year and has been seen multiple times by her primary doctor without a diagnosis. She has had 2 negative Covid-19 swabs during those visits. She also notes that 2 weeks ago she developed a cough that has been ongoing since. She states a concern for sleep apnea, but is not sure that this is the main cause of her symptoms. She also notes having a throbbing sore throat.     Allergies:  No Known Drug Allergies    Medications:    Albuterol  Ferosul  Methergine    Past Medical History:    Abnormal pap smear of cervix  Varicella  Postpartum depression  Cervical intraepithelial neoplasia  Depressive disorder  H. pylori infection    Past Surgical History:    D & C    Coloposcopy  Cervical LEEP    Family History:    Sister: asthma  Mother:Arthritis, thyroid disease    Social History:  The patient was accompanied to the ED by her .  Smoking Status: Never  Smokeless Tobacco: Never   Alcohol Use: Yes  Drug Use: No  Marital Status:        Review of Systems   HENT: Positive for sore throat.    Respiratory: Positive for cough, chest tightness and shortness of breath.      10 systems reviewed and negative except as above and in HPI.     Physical Exam   Vitals:  Patient Vitals for the past 24 hrs:   BP Temp Temp src Pulse Resp SpO2 Height Weight   20 0124 (!) 141/85 97.6  F (36.4  C)  "Temporal 88 18 100 % 1.6 m (5' 3\") 100.7 kg (222 lb)       Physical Exam  General: Resting on the gurney, appears uncomfortable  Head:  The scalp, face, and head appear normal  Mouth/Throat: Mucus membranes are moist  CV:  Regular rate    Normal S1 and S2  No pathological murmur   Resp:  Breath sounds clear and equal bilaterally    Non-labored, no retractions or accessory muscle use    No coarseness    No wheezing   GI:  Abdomen is soft, no rigidity    No tenderness to palpation  MS:  Normal motor assessment of all extremities.    Good capillary refill noted.      Skin:  No rash or lesions noted.  Neuro:   Speech is normal and fluent. No apparent deficit.  Psych: Awake. Alert.  Normal affect.      Appropriate interactions.     Emergency Department Course     Imaging:  Radiographic findings were communicated with the patient who voiced understanding of the findings.    XR Chest 2 Views  No evidence of active cardiopulmonary disease.     Reading per radiology.    Laboratory:  CBC: WNL. (WBC 7.4, HGB 11.7, )   BMP: Glucose 103 (H), Calcium 8.1 (L) o/w AWNL (Creatinine 0.61)    Troponin (Collected 0413): <0.015    Symptomatic Covid-19 Virus by PCR: Pending    Emergency Department Course:  Past medical records, nursing notes, and vitals reviewed.    0316 I performed an exam of the patient as documented above.     IV was inserted and blood was drawn for laboratory testing, results above.  The patient was sent for a chest XR while in the emergency department, results above.     0508 I rechecked the patient and discussed the results of her workup thus far.     Findings and plan explained to the Patient. Patient discharged home with instructions regarding supportive care, medications, and reasons to return. The importance of close follow-up was reviewed. The patient was prescribed Protonix.    I personally reviewed the laboratory results with the Patient and answered all related questions prior to " discharge.      Impression & Plan      Covid-19  Chely Sharp was evaluated during a global COVID-19 pandemic, which necessitated consideration that the patient might be at risk for infection with the SARS-CoV-2 virus that causes COVID-19.   Applicable protocols for evaluation were followed during the patient's care.   COVID-19 was considered as part of the patient's evaluation. The plan for testing is:  a test was obtained during this visit.    Medical Decision Making:  Chely Sharp is a 30 year old female who presents with an episode of shortness of breath while sleeping.  The patient states she has had several episodes similar to this in the past and now has been having episodes where she wakes up feeling as though she cannot breathe about once per week.  Does snore occasionally and her symptoms may be secondary to sleep apnea.  She also may have reflux contributing to her symptoms.  Evaluation today does not show dangerous cause of her shortness of breath with her description of symptoms is not consistent with dangerous etiology such as cardiovascular disease, PE, pneumonia, pneumothorax, heart failure, etc.  I will start her on Protonix to see if this helps and have recommended she follow-up with her primary care doctor to set up a sleep study.  She is comfortable with this plan and will follow-up as instructed.      Diagnosis:    ICD-10-CM    1. Shortness of breath  R06.02        Disposition:  discharged to home    Discharge Medications:  New Prescriptions    PANTOPRAZOLE (PROTONIX) 40 MG EC TABLET    Take 1 tablet (40 mg) by mouth daily for 30 doses     Be AMBROCIO, colton serving as a scribe on 7/24/2020 at 3:16 AM to personally document services performed by Jessica Connor MD based on my observations and the provider's statements to me.    Be Peñaloza  7/24/2020    EMERGENCY DEPARTMENT       Jessica Connor MD  07/24/20 0631

## 2020-07-24 NOTE — ED TRIAGE NOTES
Alert and Oriented to person, place, time and situation.    Airway patent.    Respirations are regular and unlabored.  Patient talking in full sentences.  Patient reports dry cough x 2 weeks.  Patient complaints of inability to get a full breath.     Pulses are strong and regular with palpation.    Skin is normal color, warm and dry.     Cap refill is less than 3 seconds.    Patient denies chest pain/pressure.    Patient is scheduled to have a stress test soon.

## 2020-07-24 NOTE — DISCHARGE INSTRUCTIONS
Talk to your doctor about a sleep study.    If you feel your condition has changed or worsened, please call your doctor or return to the emergency department right away.

## 2020-07-24 NOTE — ED AVS SNAPSHOT
Emergency Department  [address] [place] [id number]  Phone:  [phone]  Fax:  [phone]                                    hCely Sharp   MRN: [id number]    Department:   Emergency Department   Date of Visit:  7/24/2020           After Visit Summary Signature Page    I have received my discharge instructions, and my questions have been answered. I have discussed any challenges I see with this plan with the nurse or doctor.    ..........................................................................................................................................  Patient/Patient Representative Signature      ..........................................................................................................................................  Patient Representative Print Name and Relationship to Patient    ..................................................               ................................................  Date                                   Time    ..........................................................................................................................................  Reviewed by Signature/Title    ...................................................              ..............................................  Date                                               Time          22EPIC Rev 08/18

## 2020-07-25 LAB
SARS-COV-2 RNA SPEC QL NAA+PROBE: NOT DETECTED
SPECIMEN SOURCE: NORMAL

## 2020-12-06 ENCOUNTER — HEALTH MAINTENANCE LETTER (OUTPATIENT)
Age: 30
End: 2020-12-06

## 2021-03-10 ENCOUNTER — PATIENT OUTREACH (OUTPATIENT)
Dept: OBGYN | Facility: CLINIC | Age: 31
End: 2021-03-10

## 2021-03-10 DIAGNOSIS — Z98.890 H/O LEEP: ICD-10-CM

## 2021-04-11 ENCOUNTER — HEALTH MAINTENANCE LETTER (OUTPATIENT)
Age: 31
End: 2021-04-11

## 2021-04-14 NOTE — TELEPHONE ENCOUNTER
Patient due for COTEST  Reminder call done today - Pt notified - she states she had pap 12/2020 with a different clinic and no longer needs reminders from us. End tracking.

## 2021-06-03 ENCOUNTER — APPOINTMENT (OUTPATIENT)
Dept: CT IMAGING | Facility: CLINIC | Age: 31
End: 2021-06-03
Attending: EMERGENCY MEDICINE
Payer: COMMERCIAL

## 2021-06-03 ENCOUNTER — HOSPITAL ENCOUNTER (EMERGENCY)
Facility: CLINIC | Age: 31
Discharge: HOME | End: 2021-06-03
Attending: EMERGENCY MEDICINE | Admitting: EMERGENCY MEDICINE
Payer: COMMERCIAL

## 2021-06-03 VITALS
RESPIRATION RATE: 16 BRPM | TEMPERATURE: 98.2 F | OXYGEN SATURATION: 99 % | HEART RATE: 72 BPM | SYSTOLIC BLOOD PRESSURE: 118 MMHG | DIASTOLIC BLOOD PRESSURE: 61 MMHG

## 2021-06-03 DIAGNOSIS — R10.11 RIGHT UPPER QUADRANT PAIN: ICD-10-CM

## 2021-06-03 LAB
ALBUMIN SERPL-MCNC: 3.8 G/DL (ref 3.4–5)
ALP SERPL-CCNC: 97 U/L (ref 40–150)
ALT SERPL W P-5'-P-CCNC: 68 U/L (ref 0–50)
ANION GAP SERPL CALCULATED.3IONS-SCNC: 5 MMOL/L (ref 3–14)
AST SERPL W P-5'-P-CCNC: 55 U/L (ref 0–45)
BASOPHILS # BLD AUTO: 0 10E9/L (ref 0–0.2)
BASOPHILS NFR BLD AUTO: 0.5 %
BILIRUB SERPL-MCNC: 0.4 MG/DL (ref 0.2–1.3)
BUN SERPL-MCNC: 18 MG/DL (ref 7–30)
CALCIUM SERPL-MCNC: 8.6 MG/DL (ref 8.5–10.1)
CHLORIDE SERPL-SCNC: 107 MMOL/L (ref 94–109)
CO2 SERPL-SCNC: 28 MMOL/L (ref 20–32)
CREAT SERPL-MCNC: 0.89 MG/DL (ref 0.52–1.04)
DIFFERENTIAL METHOD BLD: NORMAL
EOSINOPHIL # BLD AUTO: 0.2 10E9/L (ref 0–0.7)
EOSINOPHIL NFR BLD AUTO: 2.1 %
ERYTHROCYTE [DISTWIDTH] IN BLOOD BY AUTOMATED COUNT: 13.2 % (ref 10–15)
GFR SERPL CREATININE-BSD FRML MDRD: 85 ML/MIN/{1.73_M2}
GLUCOSE SERPL-MCNC: 91 MG/DL (ref 70–99)
HCT VFR BLD AUTO: 39.3 % (ref 35–47)
HGB BLD-MCNC: 12.8 G/DL (ref 11.7–15.7)
IMM GRANULOCYTES # BLD: 0 10E9/L (ref 0–0.4)
IMM GRANULOCYTES NFR BLD: 0.1 %
LIPASE SERPL-CCNC: 66 U/L (ref 73–393)
LYMPHOCYTES # BLD AUTO: 2 10E9/L (ref 0.8–5.3)
LYMPHOCYTES NFR BLD AUTO: 26.6 %
MCH RBC QN AUTO: 29.7 PG (ref 26.5–33)
MCHC RBC AUTO-ENTMCNC: 32.6 G/DL (ref 31.5–36.5)
MCV RBC AUTO: 91 FL (ref 78–100)
MONOCYTES # BLD AUTO: 0.5 10E9/L (ref 0–1.3)
MONOCYTES NFR BLD AUTO: 6.8 %
NEUTROPHILS # BLD AUTO: 4.9 10E9/L (ref 1.6–8.3)
NEUTROPHILS NFR BLD AUTO: 63.9 %
NRBC # BLD AUTO: 0 10*3/UL
NRBC BLD AUTO-RTO: 0 /100
PLATELET # BLD AUTO: 291 10E9/L (ref 150–450)
POTASSIUM SERPL-SCNC: 3.8 MMOL/L (ref 3.4–5.3)
PROT SERPL-MCNC: 7.7 G/DL (ref 6.8–8.8)
RBC # BLD AUTO: 4.31 10E12/L (ref 3.8–5.2)
SODIUM SERPL-SCNC: 140 MMOL/L (ref 133–144)
WBC # BLD AUTO: 7.7 10E9/L (ref 4–11)

## 2021-06-03 PROCEDURE — 250N000011 HC RX IP 250 OP 636: Performed by: EMERGENCY MEDICINE

## 2021-06-03 PROCEDURE — 74177 CT ABD & PELVIS W/CONTRAST: CPT

## 2021-06-03 PROCEDURE — 250N000009 HC RX 250: Performed by: EMERGENCY MEDICINE

## 2021-06-03 PROCEDURE — 85025 COMPLETE CBC W/AUTO DIFF WBC: CPT | Performed by: EMERGENCY MEDICINE

## 2021-06-03 PROCEDURE — 83690 ASSAY OF LIPASE: CPT | Performed by: EMERGENCY MEDICINE

## 2021-06-03 PROCEDURE — 99285 EMERGENCY DEPT VISIT HI MDM: CPT | Mod: 25

## 2021-06-03 PROCEDURE — 80053 COMPREHEN METABOLIC PANEL: CPT | Performed by: EMERGENCY MEDICINE

## 2021-06-03 PROCEDURE — 96374 THER/PROPH/DIAG INJ IV PUSH: CPT | Mod: 59

## 2021-06-03 RX ORDER — IOPAMIDOL 755 MG/ML
500 INJECTION, SOLUTION INTRAVASCULAR ONCE
Status: COMPLETED | OUTPATIENT
Start: 2021-06-03 | End: 2021-06-03

## 2021-06-03 RX ORDER — KETOROLAC TROMETHAMINE 15 MG/ML
15 INJECTION, SOLUTION INTRAMUSCULAR; INTRAVENOUS ONCE
Status: COMPLETED | OUTPATIENT
Start: 2021-06-03 | End: 2021-06-03

## 2021-06-03 RX ADMIN — KETOROLAC TROMETHAMINE 15 MG: 15 INJECTION, SOLUTION INTRAMUSCULAR; INTRAVENOUS at 21:02

## 2021-06-03 RX ADMIN — SODIUM CHLORIDE 65 ML: 9 INJECTION, SOLUTION INTRAVENOUS at 21:19

## 2021-06-03 RX ADMIN — IOPAMIDOL 100 ML: 755 INJECTION, SOLUTION INTRAVENOUS at 21:18

## 2021-06-03 ASSESSMENT — ENCOUNTER SYMPTOMS
DYSURIA: 0
ABDOMINAL PAIN: 1
NAUSEA: 0
ABDOMINAL DISTENTION: 1
FREQUENCY: 0

## 2021-06-04 NOTE — ED TRIAGE NOTES
Pt in with C/O R sided abdominal pain. Pt reports she was seen by PCP for sx earlier this week and worked up for gallstones. Pt reports in to the ER tonight d/t increased pain. Pt A&Ox4 ABCD's intact.

## 2021-06-04 NOTE — ED PROVIDER NOTES
"History     Chief Complaint:  Abdominal Pain       The history is provided by the patient.     Chely Sharp is a 31 year old female with a history of H pylori and anemia who presents for evaluation of abdominal pain. The patient first began to have right sided abdominal pain last week. This pain is primarily to her right upper quadrant and radiates to her back. Her pain is exacerbated after eating and when in a sitting position. She notes this pain waxes and wanes in severity and makes her \"feel bloated\". Here, she denies dysuria, nausea, or urinary frequency.  She did have US done at a private clinic earlier this week.       Review of Systems   Gastrointestinal: Positive for abdominal distention and abdominal pain. Negative for nausea.   Genitourinary: Negative for dysuria and frequency.   All other systems reviewed and are negative.      Allergies:  No Known Drug Allergies      Medications:   Albuterol inhaler  Ferrous sulfate  Methergine     Medical History:   Vitamin D deficiency   Headache  H pylori   Obesity   Anemia   Depression     Surgical History:  D&C   C section   Colposcopy   LEEP, cervical    Family History:   Mother - arthritis, thyroid disease, hyperlipidemia, hypertension   Sister -  asthma    Social History:  The patient was accompanied to the ED by her .  Marital Status:   PCP: [facility]        Physical Exam     Patient Vitals for the past 24 hrs:   BP Temp Temp src Pulse Resp SpO2   06/03/21 2016 (!) 177/86 98.2  F (36.8  C) Oral 91 16 98 %        Physical Exam  Constitutional: Alert, attentive  HENT:    Nose: Nose normal.    Mouth/Throat: Oropharynx is clear, mucous membranes are moist  Eyes:  EOM are normal.    CV:  regular rate and rhythm; no murmurs, rubs or gallups  Chest: Effort normal and breath sounds normal.   GI:   Epigastrium - no tenderness, no guarding   RUQ - +mild tenderness or Ragsdale's sign   RLQ - No tenderness, no guarding, no Rovsing's " sign   Suprapubic area - no tenderness, no guarding    LLQ - no tenderness, no guarding   LUQ - no tenderness, no guarding   No distension. Normal bowel sounds  MSK: Normal range of motion.   Neurological: Alert, attentive  Skin: Skin is warm and dry.      Emergency Department Course     Imaging:    CT Abdomen Pelvis w Contrast:   IMPRESSION:   1.  Diffuse hepatic steatosis. Mild hepatomegaly.   2.  No acute abnormality in the abdomen or pelvis.  Reading per radiology.     Laboratory:    CBC: WBC 7.7, HGB 12.8,   CMP: ALT 68 (H), AST 55 (H), o/w WNL (Creatinine 0.89)   Lipase: 66 (L)      Emergency Department Course:    Reviewed:  2035 I reviewed the patient's nursing notes, vitals, past medical records, Care Everywhere.     Assessments:  2038 I obtained history and performed an exam of the patient, as documented above.   2201 I rechecked the patient and updated them on imaging and laboratory results. She is feeling improved after interventions here. We discussed the plan for discharge and they are in agreement with this plan.      Interventions:  2102 Toradol 15 mg IV     Disposition:  The patient was discharged to home.     Impression & Plan     Medical Decision Making:  This is a 31-year-old female with history of H. pylori and recent negative right upper quadrant ultrasound who presents for evaluation of ongoing postprandial right upper quadrant abdominal pain.  She has no lower quadrant tenderness to suggest appendicitis or diverticulitis.  Given ongoing symptoms and negative ultrasound, CT was performed and shows no acute abnormality.  Recommended outpatient HIDA scan previously discussed by primary care physician.  On recheck the patient is resting comfortably.  Plan discharge with primary care follow-up in 2 to 3 days, outpatient HIDA scan, and return precautions for worse pain, fever, vomiting, or any other concerns.    Diagnosis:     ICD-10-CM    1. Right upper quadrant pain  R10.11         Scribe  Disclosure:  I, Jillian Mesa, am serving as a scribe at 8:32 PM on 6/3/2021 to document services personally performed by Pasquale Porras MD based on my observations and the provider's statements to me.      Pasquale Porras MD  06/03/21 9438

## 2021-08-31 VITALS
HEART RATE: 91 BPM | DIASTOLIC BLOOD PRESSURE: 91 MMHG | OXYGEN SATURATION: 99 % | WEIGHT: 222 LBS | TEMPERATURE: 97.6 F | HEIGHT: 63 IN | RESPIRATION RATE: 18 BRPM | BODY MASS INDEX: 39.34 KG/M2 | SYSTOLIC BLOOD PRESSURE: 150 MMHG

## 2021-08-31 LAB
ABO/RH(D): NORMAL
ANTIBODY SCREEN: NEGATIVE
SPECIMEN EXPIRATION DATE: NORMAL

## 2021-08-31 PROCEDURE — 99285 EMERGENCY DEPT VISIT HI MDM: CPT | Mod: 25

## 2021-08-31 ASSESSMENT — MIFFLIN-ST. JEOR: SCORE: 1681.12

## 2021-09-01 ENCOUNTER — HOSPITAL ENCOUNTER (EMERGENCY)
Facility: CLINIC | Age: 31
Discharge: HOME | End: 2021-09-01
Attending: EMERGENCY MEDICINE | Admitting: EMERGENCY MEDICINE
Payer: COMMERCIAL

## 2021-09-01 ENCOUNTER — APPOINTMENT (OUTPATIENT)
Dept: CT IMAGING | Facility: CLINIC | Age: 31
End: 2021-09-01
Attending: EMERGENCY MEDICINE
Payer: COMMERCIAL

## 2021-09-01 DIAGNOSIS — K57.90 DIVERTICULOSIS: ICD-10-CM

## 2021-09-01 DIAGNOSIS — K92.1 HEMATOCHEZIA: ICD-10-CM

## 2021-09-01 DIAGNOSIS — R10.32 LEFT LOWER QUADRANT ABDOMINAL PAIN: ICD-10-CM

## 2021-09-01 DIAGNOSIS — R19.7 DIARRHEA, UNSPECIFIED TYPE: ICD-10-CM

## 2021-09-01 LAB
ANION GAP SERPL CALCULATED.3IONS-SCNC: 3 MMOL/L (ref 3–14)
B-HCG SERPL-ACNC: <1 IU/L (ref 0–5)
BASOPHILS # BLD AUTO: 0 10E3/UL (ref 0–0.2)
BASOPHILS NFR BLD AUTO: 0 %
BUN SERPL-MCNC: 14 MG/DL (ref 7–30)
CALCIUM SERPL-MCNC: 8.3 MG/DL (ref 8.5–10.1)
CHLORIDE BLD-SCNC: 107 MMOL/L (ref 94–109)
CO2 SERPL-SCNC: 27 MMOL/L (ref 20–32)
CREAT SERPL-MCNC: 0.76 MG/DL (ref 0.52–1.04)
EOSINOPHIL # BLD AUTO: 0.2 10E3/UL (ref 0–0.7)
EOSINOPHIL NFR BLD AUTO: 2 %
ERYTHROCYTE [DISTWIDTH] IN BLOOD BY AUTOMATED COUNT: 12.6 % (ref 10–15)
GFR SERPL CREATININE-BSD FRML MDRD: >90 ML/MIN/1.73M2
GLUCOSE BLD-MCNC: 103 MG/DL (ref 70–99)
HCT VFR BLD AUTO: 37.5 % (ref 35–47)
HGB BLD-MCNC: 12.1 G/DL (ref 11.7–15.7)
HOLD SPECIMEN: NORMAL
IMM GRANULOCYTES # BLD: 0 10E3/UL
IMM GRANULOCYTES NFR BLD: 0 %
LYMPHOCYTES # BLD AUTO: 2.1 10E3/UL (ref 0.8–5.3)
LYMPHOCYTES NFR BLD AUTO: 24 %
MCH RBC QN AUTO: 28.9 PG (ref 26.5–33)
MCHC RBC AUTO-ENTMCNC: 32.3 G/DL (ref 31.5–36.5)
MCV RBC AUTO: 90 FL (ref 78–100)
MONOCYTES # BLD AUTO: 0.5 10E3/UL (ref 0–1.3)
MONOCYTES NFR BLD AUTO: 6 %
NEUTROPHILS # BLD AUTO: 5.7 10E3/UL (ref 1.6–8.3)
NEUTROPHILS NFR BLD AUTO: 68 %
NRBC # BLD AUTO: 0 10E3/UL
NRBC BLD AUTO-RTO: 0 /100
PLATELET # BLD AUTO: 288 10E3/UL (ref 150–450)
POTASSIUM BLD-SCNC: 4.1 MMOL/L (ref 3.4–5.3)
RBC # BLD AUTO: 4.18 10E6/UL (ref 3.8–5.2)
SODIUM SERPL-SCNC: 137 MMOL/L (ref 133–144)
WBC # BLD AUTO: 8.4 10E3/UL (ref 4–11)

## 2021-09-01 PROCEDURE — 84702 CHORIONIC GONADOTROPIN TEST: CPT | Performed by: EMERGENCY MEDICINE

## 2021-09-01 PROCEDURE — 85025 COMPLETE CBC W/AUTO DIFF WBC: CPT | Performed by: EMERGENCY MEDICINE

## 2021-09-01 PROCEDURE — 86900 BLOOD TYPING SEROLOGIC ABO: CPT | Performed by: EMERGENCY MEDICINE

## 2021-09-01 PROCEDURE — 250N000011 HC RX IP 250 OP 636: Performed by: EMERGENCY MEDICINE

## 2021-09-01 PROCEDURE — 80048 BASIC METABOLIC PNL TOTAL CA: CPT | Performed by: EMERGENCY MEDICINE

## 2021-09-01 PROCEDURE — 74177 CT ABD & PELVIS W/CONTRAST: CPT

## 2021-09-01 PROCEDURE — 36415 COLL VENOUS BLD VENIPUNCTURE: CPT | Performed by: EMERGENCY MEDICINE

## 2021-09-01 PROCEDURE — 250N000009 HC RX 250: Performed by: EMERGENCY MEDICINE

## 2021-09-01 RX ORDER — IOPAMIDOL 755 MG/ML
500 INJECTION, SOLUTION INTRAVASCULAR ONCE
Status: COMPLETED | OUTPATIENT
Start: 2021-09-01 | End: 2021-09-01

## 2021-09-01 RX ADMIN — IOPAMIDOL 100 ML: 755 INJECTION, SOLUTION INTRAVENOUS at 03:28

## 2021-09-01 RX ADMIN — SODIUM CHLORIDE 65 ML: 9 INJECTION, SOLUTION INTRAVENOUS at 03:28

## 2021-09-01 ASSESSMENT — ENCOUNTER SYMPTOMS
DIARRHEA: 1
FEVER: 0
CHILLS: 1
BLOOD IN STOOL: 1
ABDOMINAL PAIN: 1

## 2021-09-01 NOTE — DISCHARGE INSTRUCTIONS
Discharge Instructions  Adult Diarrhea    You have been seen today for diarrhea (loose stools). This is usually caused by a virus, but some bacteria, parasites, medicines, or other medical conditions can cause similar symptoms. At this time your provider does not find that your diarrhea is a sign of anything dangerous or life-threatening. However, sometimes the signs of serious illness do not show up right away. If you have new or worse symptoms, you may need to be seen again in the Emergency Department or by your primary provider.     Generally, every Emergency Department visit should have a follow-up clinic visit with either a primary or a specialty clinic/provider. Please follow-up as instructed by your emergency provider today.    Return to the Emergency Department if:  You feel you are getting dehydrated, such as being very thirsty, not urinating (peeing) like usual, or feeling faint or lightheaded.   You develop a new fever.  You have abdominal (belly) pain that seems worse than cramps, is in one spot, or is getting worse over time.   You have blood in your stool or your stool becomes black.  (Remember that if you take Pepto-Bismol , this will turn your stool black).   You feel very weak.    What can I do to help myself?  The most important thing to do is to drink clear liquids.   It is best to have only small, frequent sips of liquids. Drinking too much at once may cause more diarrhea. You should also replace minerals, sodium and potassium lost with diarrhea. Pedialyte  and sports drinks can help you replace these minerals. You can also drink clear liquids such as water, weak tea, apple juice, and 7-Up . Avoid acidic liquids (orange juice), caffeine (coffee) or alcohol. Milk products will make the diarrhea worse.  Eat bland (plain) foods. Soda crackers, toast, plain noodles, gelatin, applesauce and bananas are good first choices. Avoid foods that have acid, are spicy, fatty or fibrous (such as meats, coarse  "grains, vegetables). You may start eating these foods again in about 3 days when you are better.   Sometimes treatment includes prescription medicine to prevent diarrhea. If your provider prescribes these for you, take them as directed.   Nonprescription medicine is available for the treatment of diarrhea and can be very effective. If you use it, make sure you use the dose recommended on the package. Check with your healthcare provider before you use any medicine for diarrhea.   Do not take ibuprofen, or other nonsteroidal anti-inflammatory medicines, without checking with your healthcare provider.   Probiotics: If you have been given an antibiotic, you may want to also take a probiotic pill or eat yogurt with live cultures. Probiotics have \"good bacteria\" to help your intestines stay healthy. Studies have shown that probiotics help prevent diarrhea and other intestine problems (including C. diff infection) when you take antibiotics. You can buy these without a prescription in the pharmacy section of the store.   If you were given a prescription for medicine here today, be sure to read all of the information (including the package insert) that comes with your prescription.  This will include important information about the medicine, its side effects, and any warnings that you need to know about.  The pharmacist who fills the prescription can provide more information and answer questions you may have about the medicine.  If you have questions or concerns that the pharmacist cannot address, please call or return to the Emergency Department.  Remember that you can always come back to the Emergency Department if you are not able to see your regular provider in the amount of time listed above, if you get any new symptoms, or if there is anything that worries you.     Discharge Instructions  Abdominal Pain    Abdominal pain (belly pain) can be caused by many things. Your evaluation today does not show the exact cause for " your pain. Your provider today has decided that it is unlikely your pain is due to a life threatening problem, or a problem requiring surgery or hospital admission. Sometimes those problems cannot be found right away, so it is very important that you follow up as directed.  Sometimes only the changes which occur over time allow the cause of your pain to be found.    Generally, every Emergency Department visit should have a follow-up clinic visit with either a primary or a specialty clinic/provider. Please follow-up as instructed by your emergency provider today. With abdominal pain, we often recommend very close follow-up, such as the following day.    ADULTS:  Return to the Emergency Department right away if:    You get an oral temperature above 102oF or as directed by your provider.  You have blood in your stools. This may be bright red or appear as black, tarry stools.    You keep vomiting (throwing up) or cannot drink liquids.  You see blood when you vomit.   You cannot have a bowel movement or you cannot pass gas.  Your stomach gets bloated or bigger.  Your skin or the whites of your eyes look yellow.  You faint.  You have bloody, frequent or painful urination (peeing).  You have new symptoms or anything that worries you.    CHILDREN:  Return to the Emergency Department right away if your child has any of the above-listed symptoms or the following:    Pushes your hand away or screams/cries when his/her belly is touched.  You notice your child is very fussy or weak.  Your child is very tired and is too tired to eat or drink.  Your child is dehydrated.  Signs of dehydration can be:  Significant change in the amount of wet diapers/urine.  Your infant or child starts to have dry mouth and lips, or no saliva (spit) or tears.    PREGNANT WOMEN:  Return to the Emergency Department right away if you have any of the above-listed symptoms or the following:    You have bleeding, leaking fluid or passing tissue from the  vagina.  You have worse pain or cramping, or pain in your shoulder or back.  You have vomiting that will not stop.  You have a temperature of 100oF or more.  Your baby is not moving as much as usual.  You faint.  You get a bad headache with or without eye problems and abdominal pain.  You have a seizure.  You have unusual discharge from your vagina and abdominal pain.    Abdominal pain is pretty common during pregnancy.  Your pain may or may not be related to your pregnancy. You should follow-up closely with your OB provider so they can evaluate you and your baby.  Until you follow-up with your regular provider, do the following:     Avoid sex and do not put anything in your vagina.  Drink clear fluids.  Only take medications approved by your provider.    MORE INFORMATION:    Appendicitis:  A possible cause of abdominal pain in any person who still has their appendix is acute appendicitis. Appendicitis is often hard to diagnose.  Testing does not always rule out early appendicitis or other causes of abdominal pain. Close follow-up with your provider and re-evaluations may be needed to figure out the reason for your abdominal pain.    Follow-up:  It is very important that you make an appointment with your clinic and go to the appointment.  If you do not follow-up with your primary provider, it may result in missing an important development which could result in permanent injury or disability and/or lasting pain.  If there is any problem keeping your appointment, call your provider or return to the Emergency Department.    Medications:  Take your medications as directed by your provider today.  Before using over-the-counter medications, ask your provider and make sure to take the medications as directed.  If you have any questions about medications, ask your provider.    Diet:  Resume your normal diet as much as possible, but do not eat fried, fatty or spicy foods while you have pain.  Do not drink alcohol or have  "caffeine.  Do not smoke tobacco.    Probiotics: If you have been given an antibiotic, you may want to also take a probiotic pill or eat yogurt with live cultures. Probiotics have \"good bacteria\" to help your intestines stay healthy. Studies have shown that probiotics help prevent diarrhea (loose stools) and other intestine problems (including C. diff infection) when you take antibiotics. You can buy these without a prescription in the pharmacy section of the store.     If you were given a prescription for medicine here today, be sure to read all of the information (including the package insert) that comes with your prescription.  This will include important information about the medicine, its side effects, and any warnings that you need to know about.  The pharmacist who fills the prescription can provide more information and answer questions you may have about the medicine.  If you have questions or concerns that the pharmacist cannot address, please call or return to the Emergency Department.       Remember that you can always come back to the Emergency Department if you are not able to see your regular provider in the amount of time listed above, if you get any new symptoms, or if there is anything that worries you.     Discharge Instructions  Gastrointestinal (GI) Bleeding    You have been seen today because of gastrointestinal (GI) bleeding, bleeding somewhere along your digestive tract.  Most common symptoms are blood in the stool or when you have a bowel movement.  The most common causes of minor GI bleeding are ulcers and hemorrhoids.  Other conditions that cause bleeding include abnormal blood vessels in your GI tract, diverticulosis, inflammatory bowel disease, and cancer.  Fortunately, many cases of GI bleeding are not immediately life-threatening and it does not appear that your bleeding is serious enough to require admission to the hospital.    Generally, every Emergency Department visit should have a " follow-up clinic visit with either a primary or a specialty clinic/provider. Please follow-up as instructed by your emergency provider today.    Return to the Emergency Department right away if you:  Develop fever with a temperature above 100.4 F.  Vomit (throw up) blood or something that looks like coffee grounds.  Have a bowel movement that looks like tar or has a large amount of blood in it.  Feel weak, light-headed, or faint.  Have a racing heartbeat.  Have abdominal (belly) pain that is new or increasing.  Have new symptoms that worry you.    At your follow up, your regular provider or GI specialist may order further testing such as:  Blood and stool tests.  Endoscopy and/or colonoscopy, where a tube with a camera is used to look at your digestive tract.  Other very specialized tests depending on your symptoms.    What can I do to help myself?  Take any acid reducing medication prescribed by your provider.  Avoid over the counter medications such as aspirin and Advil , Motrin  (ibuprofen) that can thin your blood or irritate your stomach, making ulcers worse.  If you were given a prescription for medicine here today, be sure to read all of the information (including the package insert) that comes with your prescription.  This will include important information about the medicine, its side effects, and any warnings that you need to know about.  The pharmacist who fills the prescription can provide more information and answer questions you may have about the medicine.  If you have questions or concerns that the pharmacist cannot address, please call or return to the Emergency Department.   Remember that you can always come back to the Emergency Department if you are not able to see your regular provider in the amount of time listed above, if you get any new symptoms, or if there is anything that worries you.

## 2021-09-01 NOTE — ED PROVIDER NOTES
"  History     Chief Complaint:  Rectal Bleeding      HPI   Chely Sharp is a 31 year old female who presents with rectal bleeding. The patient reports she has had diarrhea starting 4 days ago and has had two episodes of rectal bleeding when wiping, once early her illness, and once today. The patient states she also has abdominal pain and chills. The patient denies fever.  She denies dysuria.  She denies any sick contacts.  She denies any respiratory symptoms.  She notes mild pain around the anal opening.  She has no history of previous anorectal disorders.      Review of Systems   Constitutional: Positive for chills. Negative for fever.   Gastrointestinal: Positive for abdominal pain, blood in stool and diarrhea.   All other systems reviewed and are negative.      Allergies:  Shellfish-Derived Products    Medications:    Albuterol   Methergine     Past Medical History:    Depression    Past Surgical History:    Gyn surgery   LEEP     Family History:    Mother- hypertension, hyperlipidemia, thyroid disorder   Sister- asthma     Social History:  The patient presents with her sister.  Non-smoker.    Physical Exam     Patient Vitals for the past 24 hrs:   BP Temp Temp src Pulse Resp SpO2 Height Weight   08/31/21 2345 (!) 150/91 97.6  F (36.4  C) Oral 91 18 99 % 1.6 m (5' 3\") 100.7 kg (222 lb)       Physical Exam  General: Adult female sitting upright  Eyes: PERRL, Conjunctive within normal limits  ENT: Moist mucous membranes, oropharynx clear.   CV: Normal S1S2, no murmur, rub or gallop. Regular rate and rhythm  Resp: Clear to auscultation bilaterally, no wheezes, rales or rhonchi. Normal respiratory effort.  GI: Abdomen is soft and nondistended.  Left lower quadrant tenderness to palpation.  No palpable masses. No rebound or guarding.  Rectal: Mild tenderness.  No visible external abnormalities.  No blood on rectal digital examination.  No palpable mass.  MSK: No edema. Nontender. Normal active range of " motion.  Skin: Warm and dry. No rashes or lesions or ecchymoses on visible skin.  Neuro: Alert and oriented. Responds appropriately to all questions and commands. No focal findings appreciated. Normal muscle tone.  Psych: Normal mood and affect. Pleasant.    Emergency Department Course     Imaging:  CT Abdomen Pelvis   1.  No bowel obstruction or abscess. Appendix normal.   2.  Diverticulosis   3.  Underdistended descending and sigmoid colon reduces evaluation for wall thickening.   4.  Hypodense heterogeneity the lower uterine segment of the uterus and cervix, nonspecific. Uncertain if there are underlying nabothian cysts and/or fibroids. Consider nonurgent ultrasound follow-up.  Reading per radiology.    Laboratory:  CBC: WBC 8.4, HGB 12.1,      BMP: calcium 8.3(L) glucose 103(H) o/w WNL (Creatinine 0.76)     HCG Quantitative: <1    ABO RH Type and Screen: O POS, antibody Negative     Emergency Department Course:    Reviewed:  I reviewed nursing notes, vitals, past history and care everywhere    Assessments:  0247 I obtained history and examined the patient as noted above.     0251 Rectal exam was performed here in the emergency department. This was performed with sister at bedside.     0405 I rechecked the patient and explained findings.  She denies any new concerns.    Disposition:  The patient was discharged to home.    Impression & Plan      Medical Decision Making:    Chely Sharp is a 31 year old female who presents for evaluation of bloody stool/diarrhea. The differential diagnosis of bloody diarrhea is broad and includes such etiologies as colitis (inflammatory, infectious, ischemic), GI bleed (upper vs lower), bacterial infection of the large intestine (salmonella, shigella, campylobacter, e coli, etc), diverticular bleed, parasite, amebiasis, hemorrhoid, benign diarrhea with fissure/hemorrhoid, etc.  There are no signs of worrisome intra-abdominal pathologies detected during the visit today.   She has diverticula but no evidence of diverticulitis.  Pain was localized to the right lower quadrant but was mild.  No indication for antibiotics at this time.  Supportive outpatient management is therefore indicated.  Abdominal pain precautions are given for home. It was discussed with the patient to return to the ED for blood in stool, increasing pain, or fevers more than 102.  She is hemodynamically stable I feel comfortable discharging her home.  She should follow-up with PCP within 2 to 3 days.  Return to me to emergency department worsening.  All questions were answered prior to discharge.  Incidental findings on CT scan were discussed with the patient.    Covid-19  Chely Sharp was evaluated during a global COVID-19 pandemic, which necessitated consideration that the patient might be at risk for infection with the SARS-CoV-2 virus that causes COVID-19.   Applicable protocols for evaluation were followed during the patient's care.       Diagnosis:    ICD-10-CM    1. Diarrhea, unspecified type  R19.7    2. Hematochezia  K92.1    3. Diverticulosis  K57.90    4. Left lower quadrant abdominal pain  R10.32      Scribe Disclosure:  I, Sanjuana Copeland, am serving as a scribe at 2:47 AM on 9/1/2021 to document services personally performed by Radha Marinelli MD based on my observations and the provider's statements to me.      Radha Marinelli MD  09/01/21 5317

## 2021-09-01 NOTE — ED TRIAGE NOTES
Here for concern of blood in stool associated with dizziness and abdominal pain.. Diarrhea x4 days. Bright red blood in stool. ABCs intact.

## 2021-09-25 ENCOUNTER — HEALTH MAINTENANCE LETTER (OUTPATIENT)
Age: 31
End: 2021-09-25

## 2022-01-08 ENCOUNTER — OFFICE VISIT (OUTPATIENT)
Dept: URGENT CARE | Facility: URGENT CARE | Age: 32
End: 2022-01-08
Payer: COMMERCIAL

## 2022-01-08 VITALS
WEIGHT: 225 LBS | RESPIRATION RATE: 20 BRPM | BODY MASS INDEX: 39.86 KG/M2 | SYSTOLIC BLOOD PRESSURE: 115 MMHG | HEART RATE: 90 BPM | OXYGEN SATURATION: 100 % | DIASTOLIC BLOOD PRESSURE: 82 MMHG | TEMPERATURE: 98.4 F

## 2022-01-08 DIAGNOSIS — U07.1 INFECTION DUE TO 2019 NOVEL CORONAVIRUS: ICD-10-CM

## 2022-01-08 DIAGNOSIS — J45.20 MILD INTERMITTENT ASTHMA WITHOUT COMPLICATION: Primary | ICD-10-CM

## 2022-01-08 PROCEDURE — 99214 OFFICE O/P EST MOD 30 MIN: CPT | Performed by: INTERNAL MEDICINE

## 2022-01-08 RX ORDER — ALBUTEROL SULFATE 90 UG/1
1-2 AEROSOL, METERED RESPIRATORY (INHALATION) EVERY 6 HOURS PRN
Qty: 8.5 G | Refills: 3 | Status: SHIPPED | OUTPATIENT
Start: 2022-01-08

## 2022-01-08 RX ORDER — BENZONATATE 100 MG/1
100 CAPSULE ORAL 3 TIMES DAILY PRN
Qty: 60 CAPSULE | Refills: 0 | Status: SHIPPED | OUTPATIENT
Start: 2022-01-08

## 2022-01-09 NOTE — PROGRESS NOTES
Assessment & Plan     Mild intermittent asthma without complication  - albuterol (PROAIR HFA) 108 (90 Base) MCG/ACT inhaler; Inhale 1-2 puffs into the lungs every 6 hours as needed for shortness of breath / dyspnea or wheezing    Infection due to 2019 novel coronavirus  - benzonatate (TESSALON) 100 MG capsule; Take 1 capsule (100 mg) by mouth 3 times daily as needed for cough    Mac Mike MD  [facility]    Subjective     HPI   This patient presents with complaint of continued cough and now some shortness of breath.  She had a positive COVID on 12/30 and had been recovering.  Cough and shortness of breath has now returned and associated with some dizziness. The dizziness is not vertigo but more of a lightheadedness.  She is taking in food and fluids.  A little nauseated today but not vomiting or diarrhea.  Has history of asthma.  She has only used rescue inhaler PRN and she doesn't have any available.  A little wheezy. She has been vaccinated (Moderna x 2).     Review of Systems   Constitutional, HEENT, cardiovascular, pulmonary, gi and gu systems are negative, except as otherwise noted.      Objective    /82   Pulse 90   Temp 98.4  F (36.9  C)   Resp 20   Wt 102.1 kg (225 lb)   LMP  (LMP Unknown)   SpO2 100%   BMI 39.86 kg/m    Body mass index is 39.86 kg/m .  Physical Exam   GENERAL APPEARANCE: healthy, alert and no distress  HENT: ear canals and TM's normal and mild posterior pharyngeal cobblestoning  NECK: no adenopathy, no asymmetry, masses, or scars and thyroid normal to palpation  RESP: lungs clear to auscultation - no rales, rhonchi or wheezes

## 2022-05-07 ENCOUNTER — HEALTH MAINTENANCE LETTER (OUTPATIENT)
Age: 32
End: 2022-05-07

## 2022-09-09 ENCOUNTER — MEDICAL CORRESPONDENCE (OUTPATIENT)
Dept: HEALTH INFORMATION MANAGEMENT | Facility: CLINIC | Age: 32
End: 2022-09-09

## 2022-09-09 ENCOUNTER — TRANSFERRED RECORDS (OUTPATIENT)
Dept: HEALTH INFORMATION MANAGEMENT | Facility: CLINIC | Age: 32
End: 2022-09-09

## 2022-09-12 DIAGNOSIS — R00.0 TACHYCARDIA: Primary | ICD-10-CM

## 2022-09-16 ENCOUNTER — HOSPITAL ENCOUNTER (OUTPATIENT)
Dept: CARDIOLOGY | Facility: CLINIC | Age: 32
Discharge: HOME | End: 2022-09-16
Attending: PHYSICIAN ASSISTANT | Admitting: PHYSICIAN ASSISTANT
Payer: COMMERCIAL

## 2022-09-16 DIAGNOSIS — R00.0 TACHYCARDIA: ICD-10-CM

## 2022-09-16 LAB — LVEF ECHO: NORMAL

## 2022-09-16 PROCEDURE — 93306 TTE W/DOPPLER COMPLETE: CPT

## 2022-09-16 PROCEDURE — 93306 TTE W/DOPPLER COMPLETE: CPT | Mod: 26 | Performed by: INTERNAL MEDICINE

## 2022-12-23 ENCOUNTER — HOSPITAL ENCOUNTER (EMERGENCY)
Facility: CLINIC | Age: 32
Discharge: HOME | End: 2022-12-24
Attending: EMERGENCY MEDICINE | Admitting: EMERGENCY MEDICINE
Payer: COMMERCIAL

## 2022-12-23 DIAGNOSIS — N92.1 MENOMETRORRHAGIA: ICD-10-CM

## 2022-12-23 LAB
ABO/RH(D): NORMAL
ANTIBODY SCREEN: NEGATIVE
ERYTHROCYTE [DISTWIDTH] IN BLOOD BY AUTOMATED COUNT: 13.1 % (ref 10–15)
HCT VFR BLD AUTO: 35.6 % (ref 35–47)
HGB BLD-MCNC: 11.4 G/DL (ref 11.7–15.7)
MCH RBC QN AUTO: 29.1 PG (ref 26.5–33)
MCHC RBC AUTO-ENTMCNC: 32 G/DL (ref 31.5–36.5)
MCV RBC AUTO: 91 FL (ref 78–100)
PLATELET # BLD AUTO: 333 10E3/UL (ref 150–450)
RBC # BLD AUTO: 3.92 10E6/UL (ref 3.8–5.2)
SPECIMEN EXPIRATION DATE: NORMAL
WBC # BLD AUTO: 8.6 10E3/UL (ref 4–11)

## 2022-12-23 PROCEDURE — 85014 HEMATOCRIT: CPT | Performed by: EMERGENCY MEDICINE

## 2022-12-23 PROCEDURE — 86850 RBC ANTIBODY SCREEN: CPT | Performed by: EMERGENCY MEDICINE

## 2022-12-23 PROCEDURE — 85027 COMPLETE CBC AUTOMATED: CPT | Performed by: EMERGENCY MEDICINE

## 2022-12-23 PROCEDURE — 84702 CHORIONIC GONADOTROPIN TEST: CPT | Performed by: EMERGENCY MEDICINE

## 2022-12-23 PROCEDURE — 36415 COLL VENOUS BLD VENIPUNCTURE: CPT | Performed by: EMERGENCY MEDICINE

## 2022-12-23 PROCEDURE — 99284 EMERGENCY DEPT VISIT MOD MDM: CPT | Mod: 25

## 2022-12-23 PROCEDURE — 80048 BASIC METABOLIC PNL TOTAL CA: CPT | Performed by: EMERGENCY MEDICINE

## 2022-12-24 ENCOUNTER — APPOINTMENT (OUTPATIENT)
Dept: ULTRASOUND IMAGING | Facility: CLINIC | Age: 32
End: 2022-12-24
Attending: EMERGENCY MEDICINE
Payer: COMMERCIAL

## 2022-12-24 VITALS
RESPIRATION RATE: 18 BRPM | SYSTOLIC BLOOD PRESSURE: 109 MMHG | OXYGEN SATURATION: 97 % | TEMPERATURE: 97 F | HEART RATE: 88 BPM | DIASTOLIC BLOOD PRESSURE: 66 MMHG

## 2022-12-24 LAB
ANION GAP SERPL CALCULATED.3IONS-SCNC: 11 MMOL/L (ref 7–15)
BUN SERPL-MCNC: 16.3 MG/DL (ref 6–20)
CALCIUM SERPL-MCNC: 8.7 MG/DL (ref 8.6–10)
CHLORIDE SERPL-SCNC: 100 MMOL/L (ref 98–107)
CREAT SERPL-MCNC: 0.76 MG/DL (ref 0.51–0.95)
DEPRECATED HCO3 PLAS-SCNC: 26 MMOL/L (ref 22–29)
GFR SERPL CREATININE-BSD FRML MDRD: >90 ML/MIN/1.73M2
GLUCOSE SERPL-MCNC: 112 MG/DL (ref 70–99)
HCG INTACT+B SERPL-ACNC: <1 MIU/ML
HOLD SPECIMEN: NORMAL
HOLD SPECIMEN: NORMAL
POTASSIUM SERPL-SCNC: 4.1 MMOL/L (ref 3.4–5.3)
SODIUM SERPL-SCNC: 137 MMOL/L (ref 136–145)

## 2022-12-24 PROCEDURE — 76830 TRANSVAGINAL US NON-OB: CPT

## 2022-12-24 ASSESSMENT — ENCOUNTER SYMPTOMS
DYSURIA: 0
BACK PAIN: 1

## 2022-12-24 ASSESSMENT — ACTIVITIES OF DAILY LIVING (ADL): ADLS_ACUITY_SCORE: 35

## 2022-12-24 NOTE — ED TRIAGE NOTES
"Pt states \"gianna had my period since December 1st. Mostly spotting. Since Tuesday I've been having a \"normal\" period. Wednesday I started bleeding a lot. Now today, I've been feeling dizzy and i'm bleeding even more.\" Soaking through super tampon every hour. Large clots.       "

## 2022-12-24 NOTE — ED PROVIDER NOTES
History   Chief Complaint:  Vaginal Bleeding       HPI   Chely Sharp is a 32 year old  female with history of obesity who presents with concern for anemia in the setting of worsening vaginal bleeding since . On  she developed light spotting which progressed to moderate menstruations 3 days ago. For the past 2 days her bleeding worsened and her bleeding today is more severe and contains large clots. She has been using 1 tampon every hour and has lower back pain. She denies dysuria, use of hormonal birth control, and other medical conditions. Patient notes that for the past year her menstrual cycles have been lighter than normal.       Review of Systems   Genitourinary: Positive for vaginal bleeding. Negative for dysuria.   Musculoskeletal: Positive for back pain.   All other systems reviewed and are negative.    Allergies:  No known drug allergies     Medications:  Albuterol  methylergonovine     Past Medical History:     Obesity   Depression     Past Surgical History:    D&C    section   LEEP     Social History:  The patient is accompanied   PCP: [facility]     Physical Exam     Patient Vitals for the past 24 hrs:   BP Temp Temp src Pulse Resp SpO2   22 0030 109/66 -- -- 88 -- 96 %   22 0015 108/69 -- -- -- -- 98 %   22 2300 (!) 137/103 97  F (36.1  C) Temporal 110 18 99 %       Physical Exam  Constitutional:  Oriented to person, place, and time.  HENT:   Head:    Normocephalic.   Mouth/Throat:   Oropharynx is clear and moist.   Eyes:    EOM are normal. Pupils are equal, round, and reactive to light.   Neck:    Neck supple.   Cardiovascular:  Normal rate, regular rhythm and normal heart sounds.      Exam reveals no gallop and no friction rub.       No murmur heard.  Pulmonary/Chest:  Effort normal and breath sounds normal.      No respiratory distress. No wheezes. No rales.      No reproducible chest wall pain.  Abdominal:   Soft. No  distension. No tenderness. No rebound and no guarding.   Pelvic:    Slight blood within vaginal vault. no active bleed. No CMT or adnexal tenderness.   Musculoskeletal:  Normal range of motion.   Neurological:   Alert and oriented to person, place, and time.           Moves all 4 extremities spontaneously    Skin:    No rash noted. No pallor.      Emergency Department Course   Imaging:  US Pelvis Cmplt w Transvag & Doppler LmtPel Duplex Limited   Final Result   IMPRESSION:     1.  Unremarkable pelvic ultrasound. No evidence of ovarian torsion.                 Report per radiology    Laboratory:  Labs Ordered and Resulted from Time of ED Arrival to Time of ED Departure   CBC WITH PLATELETS - Abnormal       Result Value    WBC Count 8.6      RBC Count 3.92      Hemoglobin 11.4 (*)     Hematocrit 35.6      MCV 91      MCH 29.1      MCHC 32.0      RDW 13.1      Platelet Count 333     BASIC METABOLIC PANEL - Abnormal    Sodium 137      Potassium 4.1      Chloride 100      Carbon Dioxide (CO2) 26      Anion Gap 11      Urea Nitrogen 16.3      Creatinine 0.76      Calcium 8.7      Glucose 112 (*)     GFR Estimate >90     HCG QUANTITATIVE PREGNANCY - Normal    hCG Quantitative <1     TYPE AND SCREEN, ADULT    ABO/RH(D) O POS      Antibody Screen Negative      SPECIMEN EXPIRATION DATE [id number]     ABO/RH TYPE AND SCREEN        Emergency Department Course:  Reviewed:  I reviewed nursing notes, vitals, past medical history and Care Everywhere    Assessments:  2352 I obtained history and examined the patient as noted above.   0138 I rechecked the patient, explained findings, and performed a pelvic exam.     Disposition:  The patient was discharged to home.     Impression & Plan   Medical Decision Making:   Wendy Bonner is a 21 year old female who presents for evaluation of painless vaginal bleeding.  The differential diagnosis of vaginal bleeding is broad and includes uterine leiomyomas, endometrial polyp,  adenomyosis, bleeding disorder, hypothyroidism, primary dysmenorrhea.  More rare but serious etiologies considered included ectopic pregnancy, pregnancy, heterotopic pregnancy, etc.  In this patient, there are no signs of serious etiologies of vaginal bleeding.  Supportive outpatient management is therefore indicated.  Plan is home, close follow-up with OB, return to ED for worsening pain, heavy vaginal bleeding (more than 1 pad soaked every hour).  Questions were answered.       Diagnosis:    ICD-10-CM    1. Menometrorrhagia  N92.1           Scribe Disclosure:  I, Rk Solo , am serving as a scribe at 11:10 PM on 12/23/2022 to document services personally performed by Vargas Lee MD based on my observations and the provider's statements to me.         Vargas Lee MD  12/24/22 0618

## 2022-12-26 ENCOUNTER — HEALTH MAINTENANCE LETTER (OUTPATIENT)
Age: 32
End: 2022-12-26

## 2023-05-17 ENCOUNTER — APPOINTMENT (OUTPATIENT)
Dept: ULTRASOUND IMAGING | Facility: CLINIC | Age: 33
End: 2023-05-17
Attending: EMERGENCY MEDICINE
Payer: COMMERCIAL

## 2023-05-17 ENCOUNTER — HOSPITAL ENCOUNTER (EMERGENCY)
Facility: CLINIC | Age: 33
Discharge: HOME | End: 2023-05-17
Attending: EMERGENCY MEDICINE | Admitting: EMERGENCY MEDICINE
Payer: COMMERCIAL

## 2023-05-17 ENCOUNTER — APPOINTMENT (OUTPATIENT)
Dept: CT IMAGING | Facility: CLINIC | Age: 33
End: 2023-05-17
Attending: EMERGENCY MEDICINE
Payer: COMMERCIAL

## 2023-05-17 VITALS
DIASTOLIC BLOOD PRESSURE: 79 MMHG | RESPIRATION RATE: 16 BRPM | HEART RATE: 68 BPM | OXYGEN SATURATION: 99 % | TEMPERATURE: 97.6 F | SYSTOLIC BLOOD PRESSURE: 119 MMHG

## 2023-05-17 DIAGNOSIS — K76.0 HEPATIC STEATOSIS: ICD-10-CM

## 2023-05-17 DIAGNOSIS — R10.9 FLANK PAIN: ICD-10-CM

## 2023-05-17 LAB
ALBUMIN SERPL BCG-MCNC: 4.5 G/DL (ref 3.5–5.2)
ALBUMIN UR-MCNC: NEGATIVE MG/DL
ALP SERPL-CCNC: 122 U/L (ref 35–104)
ALT SERPL W P-5'-P-CCNC: 43 U/L (ref 10–35)
ANION GAP SERPL CALCULATED.3IONS-SCNC: 7 MMOL/L (ref 7–15)
APPEARANCE UR: CLEAR
AST SERPL W P-5'-P-CCNC: 34 U/L (ref 10–35)
BASOPHILS # BLD AUTO: 0 10E3/UL (ref 0–0.2)
BASOPHILS NFR BLD AUTO: 1 %
BILIRUB SERPL-MCNC: 0.4 MG/DL
BILIRUB UR QL STRIP: NEGATIVE
BUN SERPL-MCNC: 9 MG/DL (ref 6–20)
CALCIUM SERPL-MCNC: 8.9 MG/DL (ref 8.6–10)
CHLORIDE SERPL-SCNC: 103 MMOL/L (ref 98–107)
COLOR UR AUTO: ABNORMAL
CREAT SERPL-MCNC: 0.58 MG/DL (ref 0.51–0.95)
DEPRECATED HCO3 PLAS-SCNC: 28 MMOL/L (ref 22–29)
EOSINOPHIL # BLD AUTO: 0.2 10E3/UL (ref 0–0.7)
EOSINOPHIL NFR BLD AUTO: 3 %
ERYTHROCYTE [DISTWIDTH] IN BLOOD BY AUTOMATED COUNT: 16.4 % (ref 10–15)
GFR SERPL CREATININE-BSD FRML MDRD: >90 ML/MIN/1.73M2
GLUCOSE SERPL-MCNC: 103 MG/DL (ref 70–99)
GLUCOSE UR STRIP-MCNC: NEGATIVE MG/DL
HCG SERPL QL: NEGATIVE
HCT VFR BLD AUTO: 37.6 % (ref 35–47)
HGB BLD-MCNC: 11.5 G/DL (ref 11.7–15.7)
HGB UR QL STRIP: ABNORMAL
HOLD SPECIMEN: NORMAL
IMM GRANULOCYTES # BLD: 0 10E3/UL
IMM GRANULOCYTES NFR BLD: 0 %
KETONES UR STRIP-MCNC: NEGATIVE MG/DL
LEUKOCYTE ESTERASE UR QL STRIP: NEGATIVE
LYMPHOCYTES # BLD AUTO: 1.6 10E3/UL (ref 0.8–5.3)
LYMPHOCYTES NFR BLD AUTO: 25 %
MCH RBC QN AUTO: 24.6 PG (ref 26.5–33)
MCHC RBC AUTO-ENTMCNC: 30.6 G/DL (ref 31.5–36.5)
MCV RBC AUTO: 80 FL (ref 78–100)
MONOCYTES # BLD AUTO: 0.3 10E3/UL (ref 0–1.3)
MONOCYTES NFR BLD AUTO: 5 %
MUCOUS THREADS #/AREA URNS LPF: PRESENT /LPF
NEUTROPHILS # BLD AUTO: 4.2 10E3/UL (ref 1.6–8.3)
NEUTROPHILS NFR BLD AUTO: 66 %
NITRATE UR QL: NEGATIVE
NRBC # BLD AUTO: 0 10E3/UL
NRBC BLD AUTO-RTO: 0 /100
PH UR STRIP: 6.5 [PH] (ref 5–7)
PLATELET # BLD AUTO: 338 10E3/UL (ref 150–450)
POTASSIUM SERPL-SCNC: 4.1 MMOL/L (ref 3.4–5.3)
PROT SERPL-MCNC: 7.8 G/DL (ref 6.4–8.3)
RBC # BLD AUTO: 4.68 10E6/UL (ref 3.8–5.2)
RBC URINE: 1 /HPF
SODIUM SERPL-SCNC: 138 MMOL/L (ref 136–145)
SP GR UR STRIP: 1.01 (ref 1–1.03)
SQUAMOUS EPITHELIAL: 4 /HPF
UROBILINOGEN UR STRIP-MCNC: NORMAL MG/DL
WBC # BLD AUTO: 6.4 10E3/UL (ref 4–11)
WBC URINE: 1 /HPF

## 2023-05-17 PROCEDURE — 76705 ECHO EXAM OF ABDOMEN: CPT

## 2023-05-17 PROCEDURE — 81001 URINALYSIS AUTO W/SCOPE: CPT | Performed by: EMERGENCY MEDICINE

## 2023-05-17 PROCEDURE — 80053 COMPREHEN METABOLIC PANEL: CPT | Performed by: EMERGENCY MEDICINE

## 2023-05-17 PROCEDURE — 250N000011 HC RX IP 250 OP 636: Performed by: EMERGENCY MEDICINE

## 2023-05-17 PROCEDURE — 84703 CHORIONIC GONADOTROPIN ASSAY: CPT | Performed by: EMERGENCY MEDICINE

## 2023-05-17 PROCEDURE — 96374 THER/PROPH/DIAG INJ IV PUSH: CPT | Mod: 59

## 2023-05-17 PROCEDURE — 85025 COMPLETE CBC W/AUTO DIFF WBC: CPT | Performed by: EMERGENCY MEDICINE

## 2023-05-17 PROCEDURE — 96361 HYDRATE IV INFUSION ADD-ON: CPT

## 2023-05-17 PROCEDURE — 74176 CT ABD & PELVIS W/O CONTRAST: CPT

## 2023-05-17 PROCEDURE — 99285 EMERGENCY DEPT VISIT HI MDM: CPT | Mod: 25

## 2023-05-17 PROCEDURE — 258N000003 HC RX IP 258 OP 636: Performed by: EMERGENCY MEDICINE

## 2023-05-17 PROCEDURE — 36415 COLL VENOUS BLD VENIPUNCTURE: CPT | Performed by: EMERGENCY MEDICINE

## 2023-05-17 RX ORDER — HYDROMORPHONE HYDROCHLORIDE 1 MG/ML
0.5 INJECTION, SOLUTION INTRAMUSCULAR; INTRAVENOUS; SUBCUTANEOUS
Status: DISCONTINUED | OUTPATIENT
Start: 2023-05-17 | End: 2023-05-17 | Stop reason: HOSPADM

## 2023-05-17 RX ORDER — CYCLOBENZAPRINE HCL 10 MG
10 TABLET ORAL 3 TIMES DAILY PRN
Qty: 20 TABLET | Refills: 0 | Status: SHIPPED | OUTPATIENT
Start: 2023-05-17 | End: 2023-05-24

## 2023-05-17 RX ORDER — ONDANSETRON 2 MG/ML
4 INJECTION INTRAMUSCULAR; INTRAVENOUS EVERY 30 MIN PRN
Status: DISCONTINUED | OUTPATIENT
Start: 2023-05-17 | End: 2023-05-17 | Stop reason: HOSPADM

## 2023-05-17 RX ADMIN — ONDANSETRON 4 MG: 2 INJECTION INTRAMUSCULAR; INTRAVENOUS at 11:52

## 2023-05-17 RX ADMIN — SODIUM CHLORIDE 1000 ML: 9 INJECTION, SOLUTION INTRAVENOUS at 11:50

## 2023-05-17 ASSESSMENT — ENCOUNTER SYMPTOMS
FLANK PAIN: 1
HEMATURIA: 0
DIARRHEA: 0
BACK PAIN: 1
DYSURIA: 0
FEVER: 0
VOMITING: 0

## 2023-05-17 ASSESSMENT — ACTIVITIES OF DAILY LIVING (ADL): ADLS_ACUITY_SCORE: 35

## 2023-05-17 NOTE — DISCHARGE INSTRUCTIONS
Motrin 600mg every 6-8 hours for pain.  May use lidocaine patches topically  Follow up with your doctor on the liver tests.   Flexeril as needed at night times  Return if feeling worse

## 2023-05-17 NOTE — ED TRIAGE NOTES
A&O x4.  ABC's intact.      Pt arrives with c/o bilaterally flank pain more on the left side than right for the past couple days.

## 2023-05-17 NOTE — ED PROVIDER NOTES
History     Chief Complaint:  Flank Pain       The history is provided by the patient.     Chely Sharp is a 32 year old female with a history of anenmia who presents with bilateral lower back pain, more prominent on her right side, extending to her bilateral flanks that onset three days ago worsened yesterday into this morning to an 8/10 in intensity. Patient reports that she has irregular menstrual cycles and is currently bleeding. She denies any dysuria, hematuria, vomiting, diarrhea, or fever. Denies any history of an appendectomy and a cholecystectomy. She ate this morning and this did not affect her pain. History of  section and Leep procedure.        Independent Historian:   None - Patient Only    Review of External Notes: none     ROS:  Review of Systems   Constitutional: Negative for fever.   Gastrointestinal: Negative for diarrhea and vomiting.   Genitourinary: Positive for flank pain and vaginal bleeding. Negative for dysuria and hematuria.   Musculoskeletal: Positive for back pain.   All other systems reviewed and are negative.      Allergies:  Shellfish-derived products    Medications:    Albuterol  Advair  Pyridoxine  Meclizine    Past Medical History:    Abnormal pap smear of cervix  Vitamin D deficiency  H. Pylori infection  Depression (postpartum)  Anemia due to acute blood loss  Mental disorder postpartum  Carcinoma in situ of cervix uteri  Varicella  Rubella non-immune status, antepartum  Obesity    Past Surgical History:     delivery  Leep procedure  Colposcopy     Family History:    Mother - Arthritis, Thyroid Disease, Hypertension  Sister 2 - Asthma    Social History:  Presents alone  Presents via private vehicle.  PCP: [facility]     Physical Exam     Patient Vitals for the past 24 hrs:   BP Temp Temp src Pulse Resp SpO2   23 1047 119/79 97.6  F (36.4  C) Temporal 68 16 99 %        Physical Exam  Constitutional:       Appearance: She is  well-developed.   HENT:      Right Ear: External ear normal.      Left Ear: External ear normal.      Mouth/Throat:      Mouth: Mucous membranes are moist.      Pharynx: Oropharynx is clear. No oropharyngeal exudate.   Eyes:      General: No scleral icterus.     Conjunctiva/sclera: Conjunctivae normal.      Pupils: Pupils are equal, round, and reactive to light.   Cardiovascular:      Rate and Rhythm: Normal rate and regular rhythm.      Heart sounds: Normal heart sounds. No murmur heard.     No friction rub. No gallop.   Pulmonary:      Effort: Pulmonary effort is normal. No respiratory distress.      Breath sounds: Normal breath sounds. No wheezing or rales.   Abdominal:      General: Bowel sounds are normal. There is no distension.      Palpations: Abdomen is soft. There is no mass.      Tenderness: There is abdominal tenderness. There is no right CVA tenderness or left CVA tenderness.      Comments: Mild upper flank TTP bilaterally. Mild RUQ and epigastric TTP   Skin:     General: Skin is warm and dry.      Capillary Refill: Capillary refill takes less than 2 seconds.      Findings: No rash.   Neurological:      Mental Status: She is alert.           Emergency Department Course     Imaging:  US Abdomen Limited (RUQ)   Preliminary Result   IMPRESSION:   1.  Hepatic steatosis.   2.  Otherwise unremarkable right upper quadrant ultrasound.      CT Abdomen Pelvis w/o Contrast   Preliminary Result   IMPRESSION:    1.  No acute abnormality in the abdomen or pelvis. No cause for   bilateral flank pain is identified.   2.  Hepatic steatosis.         Report per radiology    Laboratory:  Labs Ordered and Resulted from Time of ED Arrival to Time of ED Departure   COMPREHENSIVE METABOLIC PANEL - Abnormal       Result Value    Sodium 138      Potassium 4.1      Chloride 103      Carbon Dioxide (CO2) 28      Anion Gap 7      Urea Nitrogen 9.0      Creatinine 0.58      Calcium 8.9      Glucose 103 (*)     Alkaline Phosphatase  122 (*)     AST 34      ALT 43 (*)     Protein Total 7.8      Albumin 4.5      Bilirubin Total 0.4      GFR Estimate >90     ROUTINE UA WITH MICROSCOPIC REFLEX TO CULTURE - Abnormal    Color Urine Light Yellow      Appearance Urine Clear      Glucose Urine Negative      Bilirubin Urine Negative      Ketones Urine Negative      Specific Gravity Urine 1.014      Blood Urine Moderate (*)     pH Urine 6.5      Protein Albumin Urine Negative      Urobilinogen Urine Normal      Nitrite Urine Negative      Leukocyte Esterase Urine Negative      Mucus Urine Present (*)     RBC Urine 1      WBC Urine 1      Squamous Epithelials Urine 4 (*)    CBC WITH PLATELETS AND DIFFERENTIAL - Abnormal    WBC Count 6.4      RBC Count 4.68      Hemoglobin 11.5 (*)     Hematocrit 37.6      MCV 80      MCH 24.6 (*)     MCHC 30.6 (*)     RDW 16.4 (*)     Platelet Count 338      % Neutrophils 66      % Lymphocytes 25      % Monocytes 5      % Eosinophils 3      % Basophils 1      % Immature Granulocytes 0      NRBCs per 100 WBC 0      Absolute Neutrophils 4.2      Absolute Lymphocytes 1.6      Absolute Monocytes 0.3      Absolute Eosinophils 0.2      Absolute Basophils 0.0      Absolute Immature Granulocytes 0.0      Absolute NRBCs 0.0     HCG QUALITATIVE PREGNANCY - Normal    hCG Serum Qualitative Negative            Emergency Department Course & Assessments:  Interventions:  Medications   ondansetron (ZOFRAN) injection 4 mg (4 mg Intravenous $Given 5/17/23 1152)   HYDROmorphone (PF) (DILAUDID) injection 0.5 mg (0 mg Intravenous Hold 5/17/23 1152)   0.9% sodium chloride BOLUS (1,000 mLs Intravenous $New Bag 5/17/23 1150)      Assessments:  1123 I obtained history and examined the patient as noted above.    Independent Interpretation (X-rays, CTs, rhythm strip):  none    Social Determinants of Health affecting care:   None    Disposition:  The patient was discharged to home.     Impression & Plan      Medical Decision Making:  Patient presents  today for evaluation of the above complaints.  She has very mild tenderness on the flank area in her medical quadrant.  She does have known fatty liver disease.  Her LFTs did show slight elevation.  There is no signs of UTI or blood in the urine.  CT was on remarkable except for hepatic steatosis.  I did do an ultrasound given the right upper quadrant pain.  That was also reassuring.  No signs of gallstones.  There is no intra-abdominal cause for her pain at this point.  I reassured her.  We discussed using ibuprofen and Tylenol for now.  She is referred back to her doctor for evaluation of following up on her fatty liver as well as her LFT elevation.  I did prescribe some Flexeril given that this certainly could be muscles related.  Return precautions provided.  She is asked to return if symptoms worsen or she has fever, nausea vomiting or bloody stool.  Patient voiced understanding.    Diagnosis:    ICD-10-CM    1. Flank pain  R10.9       2. Hepatic steatosis  K76.0            Discharge Medications:  New Prescriptions    CYCLOBENZAPRINE (FLEXERIL) 10 MG TABLET    Take 1 tablet (10 mg) by mouth 3 times daily as needed for muscle spasms          Scribe Disclosure:  I, Joshua Kjer, am serving as the scribe  for Gordy Prabhakar, the scribe trainee, at 2:21 PM on 5/17/2023 to document services personally performed by Troy Jewell MD based on our observations and the provider's statements to us.  Troy Jewell MD Cheng, Wenlan, MD  05/17/23 9747

## 2023-05-26 ENCOUNTER — HOSPITAL ENCOUNTER (EMERGENCY)
Facility: CLINIC | Age: 33
Discharge: HOME | End: 2023-05-26
Attending: PHYSICIAN ASSISTANT | Admitting: PHYSICIAN ASSISTANT
Payer: COMMERCIAL

## 2023-05-26 ENCOUNTER — APPOINTMENT (OUTPATIENT)
Dept: GENERAL RADIOLOGY | Facility: CLINIC | Age: 33
End: 2023-05-26
Attending: EMERGENCY MEDICINE
Payer: COMMERCIAL

## 2023-05-26 VITALS
HEART RATE: 105 BPM | DIASTOLIC BLOOD PRESSURE: 78 MMHG | TEMPERATURE: 97.4 F | RESPIRATION RATE: 18 BRPM | SYSTOLIC BLOOD PRESSURE: 103 MMHG | OXYGEN SATURATION: 94 %

## 2023-05-26 DIAGNOSIS — J18.9 PNEUMONIA OF LEFT LOWER LOBE DUE TO INFECTIOUS ORGANISM: ICD-10-CM

## 2023-05-26 DIAGNOSIS — R11.0 NAUSEA: ICD-10-CM

## 2023-05-26 LAB
FLUAV RNA SPEC QL NAA+PROBE: NEGATIVE
FLUBV RNA RESP QL NAA+PROBE: NEGATIVE
RSV RNA SPEC NAA+PROBE: NEGATIVE
SARS-COV-2 RNA RESP QL NAA+PROBE: NEGATIVE

## 2023-05-26 PROCEDURE — C9803 HOPD COVID-19 SPEC COLLECT: HCPCS

## 2023-05-26 PROCEDURE — 71046 X-RAY EXAM CHEST 2 VIEWS: CPT

## 2023-05-26 PROCEDURE — 87637 SARSCOV2&INF A&B&RSV AMP PRB: CPT | Performed by: PHYSICIAN ASSISTANT

## 2023-05-26 PROCEDURE — 87637 SARSCOV2&INF A&B&RSV AMP PRB: CPT | Performed by: EMERGENCY MEDICINE

## 2023-05-26 PROCEDURE — 99284 EMERGENCY DEPT VISIT MOD MDM: CPT | Mod: 25

## 2023-05-26 RX ORDER — ONDANSETRON 4 MG/1
4 TABLET, ORALLY DISINTEGRATING ORAL EVERY 8 HOURS PRN
Qty: 10 TABLET | Refills: 0 | Status: SHIPPED | OUTPATIENT
Start: 2023-05-26 | End: 2023-05-29

## 2023-05-26 RX ORDER — DOXYCYCLINE 100 MG/1
100 CAPSULE ORAL 2 TIMES DAILY
Qty: 10 CAPSULE | Refills: 0 | Status: SHIPPED | OUTPATIENT
Start: 2023-05-26 | End: 2023-05-31

## 2023-05-26 NOTE — ED TRIAGE NOTES
A&O x4, ABCs intact. Pt presents with cough and SOB that she's had since Monday. Pt states that was seen already for this. COVID and strep were negative. Pt states she's feeling worse.        Triage Assessment     Row Name 05/26/23 2705       Triage Assessment (Adult)    Airway WDL WDL       Respiratory WDL    Respiratory WDL X;cough    Cough Frequency frequent

## 2023-05-26 NOTE — ED PROVIDER NOTES
History     Chief Complaint:  Cough       HPI   Chely Sharp is a 32 year old female who presents with cough, congestion, and fever.  Patient states she has symptoms for the past 3 days and was prescribed Augmentin for suspected sinus infection. Patient states her symptoms have worsened since taking antibiotic and notes her cough is persistent. Patient has intermittent fever however she did not note a fever today.  She also endorses some shortness of breath especially during coughing.  She denies prior cardiac or pulmonary disease.  Patient was seen in the emergency department for flank pain 3 days ago with a negative work-up.  Patient states this is since improved however her cough has not.    Independent Historian:   None - Patient Only    Review of External Notes:   ED 05/23/2023       Medications:    doxycycline hyclate (VIBRAMYCIN) 100 MG capsule  ondansetron (ZOFRAN ODT) 4 MG ODT tab  albuterol (PROAIR HFA) 108 (90 Base) MCG/ACT inhaler  benzonatate (TESSALON) 100 MG capsule  ferrous sulfate (FEROSUL) 325 (65 Fe) MG tablet  ibuprofen (ADVIL/MOTRIN) 600 MG tablet  methylergonovine (METHERGINE) 0.2 MG tablet        Past Medical History:    Past Medical History:   Diagnosis Date     Abnormal Pap smear of cervix 09/28/2006, 2/16/19, 2/13/20     Cervical high risk HPV (human papillomavirus) test positive 2/13/2020     History of colposcopy 03/12/2020       Past Surgical History:    Past Surgical History:   Procedure Laterality Date     DILATION AND CURETTAGE SUCTION N/A 3/11/2019    Procedure: DILATION AND CURETTAGE SUCTION;  Surgeon: Jazmyne Machado MD;  Location: [facility] OR     GYN SURGERY       LEEP TX, CERVICAL  03/26/2020    MELLISSA 2-3, margins neg        Physical Exam     Patient Vitals for the past 24 hrs:   BP Temp Temp src Pulse Resp SpO2   05/26/23 1621 103/78 -- -- 105 18 94 %   05/26/23 1417 129/79 97.4  F (36.3  C) Temporal 111 18 94 %        Physical Exam  Constitutional: Alert, attentive,  GCS 15  HENT:    Nose: Nose normal.    Mouth/Throat: Oropharynx is clear, mucous membranes are moist   Eyes: EOM are normal.   CV: regular rate and rhythm; no murmurs, rubs or gallups  Chest: Effort normal and breath sounds normal. Productive cough.  No wheezing or stridor.  GI:  There is no tenderness. No distension. Normal bowel sounds  MSK: Normal range of motion.   Neurological: Alert, attentive  Skin: Skin is warm and dry.      Emergency Department Course     Imaging:  Chest XR,  PA & LAT   Final Result   IMPRESSION: Patchy opacity at the left lower lobe newly identified.   Correlate with pneumonia or other airspace disease. Right lung appears   clear. Normal cardiac silhouette.      YONG PEÑA MD            SYSTEM ID:  [id number]         Report per radiology    Laboratory:  Labs Ordered and Resulted from Time of ED Arrival to Time of ED Departure   INFLUENZA A/B, RSV, & SARS-COV2 PCR - Normal       Result Value    Influenza A PCR Negative      Influenza B PCR Negative      RSV PCR Negative      SARS CoV2 PCR Negative          Emergency Department Course & Assessments:         Interventions:  Medications - No data to display     Assessments:  1625  I obtained patient history and performed physical examination as above.    Independent Interpretation (X-rays, CTs, rhythm strip):  Opacification on left lower lung on chest xray as pointed by arrows.    Consultations/Discussion of Management or Tests:  None        Social Determinants of Health affecting care:   None    Disposition:  The patient was discharged to home.     Impression & Plan    CMS Diagnoses: None    Medical Decision Making:  Patient is a well-appearing 32-year-old female who presents with fever, congestion, and productive cough for the past three days.  She is in no acute respiratory stress upon arrival.  She is afebrile, normotensive, nonhypoxic.  Physical examination reveals productive cough but otherwise benign cardiac and pulmonary  examination.  COVID-19 and influenza testing is negative today.  Chest x-ray shows a developing left lower lobe pneumonia.  Results reviewed and discussed with patient.  She is able to tolerate oral intake however has mild nausea.  She is currently on Augmentin.  I will also add a course of doxycycline along with Augmentin for complete coverage for CAP and atypical pneumonia. Patient also has tessalon perles.  She is otherwise well-appearing and there is no evidence of hypoxia or respiratory distress.  She has few comorbidities at this time and remains hemodynamically stable.  She may be managed as an outpatient.  I recommend she follow-up with primary care if she is not improving within 3 days.  Supportive cares and return precautions to the ED were discussed.  Patient expressed understanding of plan and is ready for discharge.    Diagnosis:    ICD-10-CM    1. Pneumonia of left lower lobe due to infectious organism  J18.9       2. Nausea  R11.0            Discharge Medications:  Discharge Medication List as of 5/26/2023  4:42 PM      START taking these medications    Details   doxycycline hyclate (VIBRAMYCIN) 100 MG capsule Take 1 capsule (100 mg) by mouth 2 times daily for 5 days, Disp-10 capsule, R-0, E-Prescribe      ondansetron (ZOFRAN ODT) 4 MG ODT tab Take 1 tablet (4 mg) by mouth every 8 hours as needed for nausea, Disp-10 tablet, R-0, E-Prescribe              5/26/2023   Malorie Thomas PA-C Steinbrueck, Emily, PA-C  05/26/23 0108

## 2023-06-02 ENCOUNTER — HEALTH MAINTENANCE LETTER (OUTPATIENT)
Age: 33
End: 2023-06-02

## 2024-01-06 ENCOUNTER — APPOINTMENT (OUTPATIENT)
Dept: ULTRASOUND IMAGING | Facility: CLINIC | Age: 34
End: 2024-01-06
Attending: EMERGENCY MEDICINE
Payer: COMMERCIAL

## 2024-01-06 ENCOUNTER — HOSPITAL ENCOUNTER (EMERGENCY)
Facility: CLINIC | Age: 34
Discharge: HOME | End: 2024-01-06
Attending: EMERGENCY MEDICINE | Admitting: EMERGENCY MEDICINE
Payer: COMMERCIAL

## 2024-01-06 VITALS
HEART RATE: 90 BPM | SYSTOLIC BLOOD PRESSURE: 144 MMHG | HEIGHT: 63 IN | RESPIRATION RATE: 20 BRPM | TEMPERATURE: 98 F | WEIGHT: 228.18 LBS | BODY MASS INDEX: 40.43 KG/M2 | OXYGEN SATURATION: 98 % | DIASTOLIC BLOOD PRESSURE: 78 MMHG

## 2024-01-06 DIAGNOSIS — R10.10 UPPER ABDOMINAL PAIN: ICD-10-CM

## 2024-01-06 LAB
ALBUMIN SERPL BCG-MCNC: 4.4 G/DL (ref 3.5–5.2)
ALBUMIN UR-MCNC: 10 MG/DL
ALP SERPL-CCNC: 122 U/L (ref 40–150)
ALT SERPL W P-5'-P-CCNC: 47 U/L (ref 0–50)
ANION GAP SERPL CALCULATED.3IONS-SCNC: 11 MMOL/L (ref 7–15)
APPEARANCE UR: CLEAR
AST SERPL W P-5'-P-CCNC: 43 U/L (ref 0–45)
BACTERIA #/AREA URNS HPF: ABNORMAL /HPF
BASOPHILS # BLD AUTO: 0 10E3/UL (ref 0–0.2)
BASOPHILS NFR BLD AUTO: 0 %
BILIRUB DIRECT SERPL-MCNC: <0.2 MG/DL (ref 0–0.3)
BILIRUB SERPL-MCNC: 0.4 MG/DL
BILIRUB UR QL STRIP: NEGATIVE
BUN SERPL-MCNC: 17 MG/DL (ref 6–20)
CALCIUM SERPL-MCNC: 8.8 MG/DL (ref 8.6–10)
CHLORIDE SERPL-SCNC: 103 MMOL/L (ref 98–107)
COLOR UR AUTO: YELLOW
CREAT SERPL-MCNC: 0.79 MG/DL (ref 0.51–0.95)
DEPRECATED HCO3 PLAS-SCNC: 25 MMOL/L (ref 22–29)
EGFRCR SERPLBLD CKD-EPI 2021: >90 ML/MIN/1.73M2
EOSINOPHIL # BLD AUTO: 0.4 10E3/UL (ref 0–0.7)
EOSINOPHIL NFR BLD AUTO: 4 %
ERYTHROCYTE [DISTWIDTH] IN BLOOD BY AUTOMATED COUNT: 14.8 % (ref 10–15)
GLUCOSE SERPL-MCNC: 99 MG/DL (ref 70–99)
GLUCOSE UR STRIP-MCNC: NEGATIVE MG/DL
HCG SERPL QL: NEGATIVE
HCT VFR BLD AUTO: 38.4 % (ref 35–47)
HGB BLD-MCNC: 12.3 G/DL (ref 11.7–15.7)
HGB UR QL STRIP: NEGATIVE
HOLD SPECIMEN: NORMAL
IMM GRANULOCYTES # BLD: 0 10E3/UL
IMM GRANULOCYTES NFR BLD: 0 %
KETONES UR STRIP-MCNC: NEGATIVE MG/DL
LEUKOCYTE ESTERASE UR QL STRIP: NEGATIVE
LIPASE SERPL-CCNC: 17 U/L (ref 13–60)
LYMPHOCYTES # BLD AUTO: 1.9 10E3/UL (ref 0.8–5.3)
LYMPHOCYTES NFR BLD AUTO: 22 %
MCH RBC QN AUTO: 26.5 PG (ref 26.5–33)
MCHC RBC AUTO-ENTMCNC: 32 G/DL (ref 31.5–36.5)
MCV RBC AUTO: 83 FL (ref 78–100)
MONOCYTES # BLD AUTO: 0.5 10E3/UL (ref 0–1.3)
MONOCYTES NFR BLD AUTO: 6 %
MUCOUS THREADS #/AREA URNS LPF: PRESENT /LPF
NEUTROPHILS # BLD AUTO: 6 10E3/UL (ref 1.6–8.3)
NEUTROPHILS NFR BLD AUTO: 68 %
NITRATE UR QL: NEGATIVE
NRBC # BLD AUTO: 0 10E3/UL
NRBC BLD AUTO-RTO: 0 /100
PH UR STRIP: 6.5 [PH] (ref 5–7)
PLATELET # BLD AUTO: 319 10E3/UL (ref 150–450)
POTASSIUM SERPL-SCNC: 3.8 MMOL/L (ref 3.4–5.3)
PROT SERPL-MCNC: 8.3 G/DL (ref 6.4–8.3)
RBC # BLD AUTO: 4.65 10E6/UL (ref 3.8–5.2)
RBC URINE: <1 /HPF
SODIUM SERPL-SCNC: 139 MMOL/L (ref 135–145)
SP GR UR STRIP: 1.03 (ref 1–1.03)
SQUAMOUS EPITHELIAL: 5 /HPF
UROBILINOGEN UR STRIP-MCNC: 6 MG/DL
WBC # BLD AUTO: 8.9 10E3/UL (ref 4–11)
WBC URINE: 2 /HPF

## 2024-01-06 PROCEDURE — 85025 COMPLETE CBC W/AUTO DIFF WBC: CPT | Performed by: EMERGENCY MEDICINE

## 2024-01-06 PROCEDURE — 99285 EMERGENCY DEPT VISIT HI MDM: CPT | Mod: 25

## 2024-01-06 PROCEDURE — 84703 CHORIONIC GONADOTROPIN ASSAY: CPT | Performed by: EMERGENCY MEDICINE

## 2024-01-06 PROCEDURE — 83690 ASSAY OF LIPASE: CPT | Performed by: EMERGENCY MEDICINE

## 2024-01-06 PROCEDURE — 36415 COLL VENOUS BLD VENIPUNCTURE: CPT | Performed by: EMERGENCY MEDICINE

## 2024-01-06 PROCEDURE — 250N000011 HC RX IP 250 OP 636: Performed by: EMERGENCY MEDICINE

## 2024-01-06 PROCEDURE — 96374 THER/PROPH/DIAG INJ IV PUSH: CPT

## 2024-01-06 PROCEDURE — 81001 URINALYSIS AUTO W/SCOPE: CPT | Performed by: EMERGENCY MEDICINE

## 2024-01-06 PROCEDURE — 80048 BASIC METABOLIC PNL TOTAL CA: CPT | Performed by: EMERGENCY MEDICINE

## 2024-01-06 PROCEDURE — 82248 BILIRUBIN DIRECT: CPT | Performed by: EMERGENCY MEDICINE

## 2024-01-06 PROCEDURE — 76705 ECHO EXAM OF ABDOMEN: CPT

## 2024-01-06 RX ORDER — KETOROLAC TROMETHAMINE 15 MG/ML
15 INJECTION, SOLUTION INTRAMUSCULAR; INTRAVENOUS ONCE
Status: COMPLETED | OUTPATIENT
Start: 2024-01-06 | End: 2024-01-06

## 2024-01-06 RX ADMIN — KETOROLAC TROMETHAMINE 15 MG: 15 INJECTION, SOLUTION INTRAMUSCULAR; INTRAVENOUS at 19:16

## 2024-01-06 ASSESSMENT — ACTIVITIES OF DAILY LIVING (ADL): ADLS_ACUITY_SCORE: 35

## 2024-01-07 NOTE — ED PROVIDER NOTES
"  History     Chief Complaint:  Abdominal Pain and Flank Pain       HPI   Chely Sharp is a 33 year old female who presents to the ED with abdominal pain. She explains that over the past month she has experienced intermittent upper abdominal pain after meals. She notes that the pain is often bilateral though over the past week it has been more so left sided. This pain is also worsened by deep breath. Two weeks ago she was seen by her PCP who recommended she take Miralax though this has not provided resolution. Today around 1230 she ate lunch and almost immediately after developed this pain once more, prompting today's ED visit. She further endorses nausea, denies history of abdominal surgery.    Independent Historian:   None - Patient Only    Review of External Notes:   Reviewed 9/26/23 office visit     Medications:    Albuterol  Tessalon  Ferosul  Advil  Methergine    Past Medical History:   Abnormal Pap smear of cervix  Cervical high risk HPV    Past Surgical History:   Dilation and curettage suction  Gyn surgery  Leep TX, cervical     Physical Exam   Patient Vitals for the past 24 hrs:   BP Temp Temp src Pulse Resp SpO2 Height Weight   01/06/24 2016 (!) 144/78 98  F (36.7  C) Oral 90 20 98 % -- --   01/06/24 1840 (!) 155/91 97.6  F (36.4  C) Temporal 89 16 100 % 1.6 m (5' 3\") 103.5 kg (228 lb 2.8 oz)        Physical Exam  Constitutional: Alert, attentive  HENT:    Nose: Nose normal.    Mouth/Throat: Oropharynx is clear, mucous membranes are moist   Eyes: EOM are normal.   CV: regular rate and rhythm; no murmurs, rubs or gallups  Chest: Effort normal and breath sounds normal.   GI:  There is no tenderness. No distension. Normal bowel sounds  MSK: Normal range of motion.   Neurological: Alert, attentive  Skin: Skin is warm and dry.      Emergency Department Course     Imaging:  US Abdomen Limited   Final Result   IMPRESSION:   1.  Normal limited abdominal ultrasound.                 Laboratory:  Labs Ordered and " Resulted from Time of ED Arrival to Time of ED Departure   ROUTINE UA WITH MICROSCOPIC REFLEX TO CULTURE - Abnormal       Result Value    Color Urine Yellow      Appearance Urine Clear      Glucose Urine Negative      Bilirubin Urine Negative      Ketones Urine Negative      Specific Gravity Urine 1.028      Blood Urine Negative      pH Urine 6.5      Protein Albumin Urine 10 (*)     Urobilinogen Urine 6.0 (*)     Nitrite Urine Negative      Leukocyte Esterase Urine Negative      Bacteria Urine Few (*)     Mucus Urine Present (*)     RBC Urine <1      WBC Urine 2      Squamous Epithelials Urine 5 (*)    BASIC METABOLIC PANEL - Normal    Sodium 139      Potassium 3.8      Chloride 103      Carbon Dioxide (CO2) 25      Anion Gap 11      Urea Nitrogen 17.0      Creatinine 0.79      GFR Estimate >90      Calcium 8.8      Glucose 99     HCG QUALITATIVE PREGNANCY - Normal    hCG Serum Qualitative Negative     HEPATIC FUNCTION PANEL - Normal    Protein Total 8.3      Albumin 4.4      Bilirubin Total 0.4      Alkaline Phosphatase 122      AST 43      ALT 47      Bilirubin Direct <0.20     LIPASE - Normal    Lipase 17     CBC WITH PLATELETS AND DIFFERENTIAL    WBC Count 8.9      RBC Count 4.65      Hemoglobin 12.3      Hematocrit 38.4      MCV 83      MCH 26.5      MCHC 32.0      RDW 14.8      Platelet Count 319      % Neutrophils 68      % Lymphocytes 22      % Monocytes 6      % Eosinophils 4      % Basophils 0      % Immature Granulocytes 0      NRBCs per 100 WBC 0      Absolute Neutrophils 6.0      Absolute Lymphocytes 1.9      Absolute Monocytes 0.5      Absolute Eosinophils 0.4      Absolute Basophils 0.0      Absolute Immature Granulocytes 0.0      Absolute NRBCs 0.0          Procedures       Emergency Department Course & Assessments:       Interventions:  Medications   ketorolac (TORADOL) injection 15 mg (15 mg Intravenous $Given 1/6/24 1916)        Assessments:  1858 I obtained history and examined the patient as  noted above.  1957 I rechecked the patient and explained findings.    Independent Interpretation (X-rays, CTs, rhythm strip):  No gallstones on RUQ US    Consultations/Discussion of Management or Tests:  None        Social Determinants of Health affecting care:   None    Disposition:  The patient was discharged to home.     Impression & Plan      Medical Decision Making:  This is a pleasant 33-year-old female who presents for evaluation of recurrent upper abdominal pain with previous workup being negative and May 2023 and previously, now with more left upper quadrant abdominal pain.  She is PERC negative, since ruling out PE.  She has no lower quadrant abdominal pain or tenderness.  Labs show no hematologic abnormality, no evidence of pancreatitis, no abnormal LFTs, and no evidence of UTI.  Right upper quadrant ultrasound is negative.  Given the postprandial nature of the pain also discussed this could represent PUD/gastritis.  We did have a shared decision-making conversation regarding CT imaging, which she would prefer to avoid at this time.  I believe this is reasonable given the episodic nature of pain, that it has now resolved, and that she has had previous negative CT images.  Plan outpatient follow-up with GI for upper endoscopy and start omeprazole as an outpatient.  Return precautions for worse pain, fever, vomiting, or any other concerns.  GI follow-up for upper endoscopy and primary care follow-up for recheck in 3 to 5 days.      Diagnosis:    ICD-10-CM    1. Upper abdominal pain  R10.10 Adult GI  Referral - Procedure Only             Scribe Disclosure:  I, MILLA PALACIOS, am serving as a scribe at 6:50 PM on 1/6/2024 to document services personally performed by Pasquale Porras MD based on my observations and the provider's statements to me.     1/6/2024   Pasquale Porras MD [place]ough[place], John Eric, MD  01/06/24 0921

## 2024-01-07 NOTE — ED TRIAGE NOTES
Pt arrives with c/o left sided abdominal pain, radiates to left flank. Has been present for the last few weeks. +Nausea, no vomiting. Recently saw PMD for symptoms, diagnosed with constipation, prescribed miralax daily. Pt reports urinary frequency. A&OX4.      Triage Assessment (Adult)       Row Name 01/06/24 1842          Triage Assessment    Airway WDL WDL        Respiratory WDL    Respiratory WDL WDL        Skin Circulation/Temperature WDL    Skin Circulation/Temperature WDL WDL        Cardiac WDL    Cardiac WDL WDL        Peripheral/Neurovascular WDL    Peripheral Neurovascular WDL WDL        Cognitive/Neuro/Behavioral WDL    Cognitive/Neuro/Behavioral WDL WDL

## 2024-01-10 ENCOUNTER — TELEPHONE (OUTPATIENT)
Dept: GASTROENTEROLOGY | Facility: CLINIC | Age: 34
End: 2024-01-10
Payer: COMMERCIAL

## 2024-01-10 NOTE — TELEPHONE ENCOUNTER
"Endoscopy Scheduling Screen    Have you had a positive Covid test in the last 14 days?  No    Are you active on MyChart?   No    What insurance is in the chart?  Other:  [facility]    Ordering/Referring Provider:     MARILYN LOPEZ      (If ordering provider performs procedure, schedule with ordering provider unless otherwise instructed. )    BMI: Estimated body mass index is 40.42 kg/m  as calculated from the following:    Height as of 1/6/24: 1.6 m (5' 3\").    Weight as of 1/6/24: 103.5 kg (228 lb 2.8 oz).     Sedation Ordered  moderate sedation.   If patient BMI > 50 do not schedule in [facility].    If patient BMI > 45 do not schedule at [facility].    Are you taking methadone or Suboxone?  No    Are you taking any prescription medications for pain 3 or more times per week?   NO - No RN review required.    Do you have a history of malignant hyperthermia or adverse reaction to anesthesia?  No    (Females) Are you currently pregnant?   No     Have you been diagnosed or told you have pulmonary hypertension?   No    Do you have an LVAD?  No    Have you been told you have moderate to severe sleep apnea?  No    Have you been told you have COPD, asthma, or any other lung disease?  Yes     What breathing problems do you have?  Asthma     Do you use home oxygen?  No    Have your breathing problems required an ED visit or hospitalization in the last year?  No    Do you have any heart conditions?  No     Have you ever had an organ transplant?   No    Have you ever had or are you awaiting a heart or lung transplant?   No    Have you had a stroke or transient ischemic attack (TIA aka \"mini stroke\" in the last 6 months?   No    Have you been diagnosed with or been told you have cirrhosis of the liver?   No    Are you currently on dialysis?   No    Do you need assistance transferring?   No    BMI: Estimated body mass index is 40.42 kg/m  as calculated from the following:    Height as of 1/6/24: 1.6 m (5' 3\").    Weight as of 1/6/24: " 103.5 kg (228 lb 2.8 oz).     Is patients BMI > 40 and scheduling location [facility]?  No    Do you take an injectable medication for weight loss or diabetes (excluding insulin)?  No    Do you take the medication Naltrexone?  No    Do you take blood thinners?  No       Prep   Are you currently on dialysis or do you have chronic kidney disease?  No    Do you have a diagnosis of diabetes?  No    Do you have a diagnosis of cystic fibrosis (CF)?  No    On a regular basis do you go 3 -5 days between bowel movements?  No    BMI > 40?  No    Preferred Pharmacy:      [id number] - [place], [place] - [address] AT [place] [address]  [address] [facility] [address]  Phone: [phone] Fax: [phone]      Final Scheduling Details   Colonoscopy prep sent?  N/A    Procedure scheduled  Upper endoscopy (EGD)    Surgeon:  Tho     Date of procedure:  2/14/24     Pre-OP / PAC:   No - Not required for this site.    Location  [facility] - Per order.    Sedation   Moderate Sedation - Per order.      Patient Reminders:   You will receive a call from a Nurse to review instructions and health history.  This assessment must be completed prior to your procedure.  Failure to complete the Nurse assessment may result in the procedure being cancelled.      On the day of your procedure, please designate an adult(s) who can drive you home stay with you for the next 24 hours. The medicines used in the exam will make you sleepy. You will not be able to drive.      You cannot take public transportation, ride share services, or non-medical taxi service without a responsible caregiver.  Medical transport services are allowed with the requirement that a responsible caregiver will receive you at your destination.  We require that drivers and caregivers are confirmed prior to your procedure.

## 2024-02-14 ENCOUNTER — HOSPITAL ENCOUNTER (OUTPATIENT)
Facility: CLINIC | Age: 34
Discharge: HOME | End: 2024-02-14
Attending: INTERNAL MEDICINE | Admitting: INTERNAL MEDICINE
Payer: COMMERCIAL

## 2024-02-14 VITALS
DIASTOLIC BLOOD PRESSURE: 69 MMHG | TEMPERATURE: 97.4 F | HEIGHT: 63 IN | OXYGEN SATURATION: 98 % | WEIGHT: 230 LBS | SYSTOLIC BLOOD PRESSURE: 102 MMHG | HEART RATE: 88 BPM | RESPIRATION RATE: 16 BRPM | BODY MASS INDEX: 40.75 KG/M2

## 2024-02-14 LAB — UPPER GI ENDOSCOPY: NORMAL

## 2024-02-14 PROCEDURE — 88305 TISSUE EXAM BY PATHOLOGIST: CPT | Mod: TC | Performed by: INTERNAL MEDICINE

## 2024-02-14 PROCEDURE — 250N000009 HC RX 250: Performed by: INTERNAL MEDICINE

## 2024-02-14 PROCEDURE — 999N000099 HC STATISTIC MODERATE SEDATION < 10 MIN: Performed by: INTERNAL MEDICINE

## 2024-02-14 PROCEDURE — 43239 EGD BIOPSY SINGLE/MULTIPLE: CPT | Performed by: INTERNAL MEDICINE

## 2024-02-14 PROCEDURE — 250N000011 HC RX IP 250 OP 636: Performed by: INTERNAL MEDICINE

## 2024-02-14 RX ORDER — ONDANSETRON 4 MG/1
4 TABLET, ORALLY DISINTEGRATING ORAL EVERY 6 HOURS PRN
Status: DISCONTINUED | OUTPATIENT
Start: 2024-02-14 | End: 2024-02-14 | Stop reason: HOSPADM

## 2024-02-14 RX ORDER — NALOXONE HYDROCHLORIDE 0.4 MG/ML
0.2 INJECTION, SOLUTION INTRAMUSCULAR; INTRAVENOUS; SUBCUTANEOUS
Status: DISCONTINUED | OUTPATIENT
Start: 2024-02-14 | End: 2024-02-14 | Stop reason: HOSPADM

## 2024-02-14 RX ORDER — NALOXONE HYDROCHLORIDE 0.4 MG/ML
0.4 INJECTION, SOLUTION INTRAMUSCULAR; INTRAVENOUS; SUBCUTANEOUS
Status: DISCONTINUED | OUTPATIENT
Start: 2024-02-14 | End: 2024-02-14 | Stop reason: HOSPADM

## 2024-02-14 RX ORDER — SIMETHICONE 40MG/0.6ML
133 SUSPENSION, DROPS(FINAL DOSAGE FORM)(ML) ORAL
Status: DISCONTINUED | OUTPATIENT
Start: 2024-02-14 | End: 2024-02-14 | Stop reason: HOSPADM

## 2024-02-14 RX ORDER — DIPHENHYDRAMINE HYDROCHLORIDE 50 MG/ML
25-50 INJECTION INTRAMUSCULAR; INTRAVENOUS
Status: DISCONTINUED | OUTPATIENT
Start: 2024-02-14 | End: 2024-02-14 | Stop reason: HOSPADM

## 2024-02-14 RX ORDER — ATROPINE SULFATE 0.1 MG/ML
1 INJECTION INTRAVENOUS
Status: DISCONTINUED | OUTPATIENT
Start: 2024-02-14 | End: 2024-02-14 | Stop reason: HOSPADM

## 2024-02-14 RX ORDER — ONDANSETRON 2 MG/ML
4 INJECTION INTRAMUSCULAR; INTRAVENOUS EVERY 6 HOURS PRN
Status: DISCONTINUED | OUTPATIENT
Start: 2024-02-14 | End: 2024-02-14 | Stop reason: HOSPADM

## 2024-02-14 RX ORDER — FLUMAZENIL 0.1 MG/ML
0.2 INJECTION, SOLUTION INTRAVENOUS
Status: DISCONTINUED | OUTPATIENT
Start: 2024-02-14 | End: 2024-02-14 | Stop reason: HOSPADM

## 2024-02-14 RX ORDER — FENTANYL CITRATE 50 UG/ML
50-100 INJECTION, SOLUTION INTRAMUSCULAR; INTRAVENOUS EVERY 5 MIN PRN
Status: DISCONTINUED | OUTPATIENT
Start: 2024-02-14 | End: 2024-02-14 | Stop reason: HOSPADM

## 2024-02-14 RX ORDER — EPINEPHRINE 1 MG/ML
0.1 INJECTION, SOLUTION INTRAMUSCULAR; SUBCUTANEOUS
Status: DISCONTINUED | OUTPATIENT
Start: 2024-02-14 | End: 2024-02-14 | Stop reason: HOSPADM

## 2024-02-14 RX ORDER — PROCHLORPERAZINE MALEATE 10 MG
10 TABLET ORAL EVERY 6 HOURS PRN
Status: DISCONTINUED | OUTPATIENT
Start: 2024-02-14 | End: 2024-02-14 | Stop reason: HOSPADM

## 2024-02-14 RX ORDER — ONDANSETRON 2 MG/ML
4 INJECTION INTRAMUSCULAR; INTRAVENOUS
Status: DISCONTINUED | OUTPATIENT
Start: 2024-02-14 | End: 2024-02-14 | Stop reason: HOSPADM

## 2024-02-14 RX ORDER — LIDOCAINE 40 MG/G
CREAM TOPICAL
Status: DISCONTINUED | OUTPATIENT
Start: 2024-02-14 | End: 2024-02-14 | Stop reason: HOSPADM

## 2024-02-14 RX ADMIN — MIDAZOLAM 2 MG: 1 INJECTION INTRAMUSCULAR; INTRAVENOUS at 10:43

## 2024-02-14 RX ADMIN — FENTANYL CITRATE 100 MCG: 50 INJECTION, SOLUTION INTRAMUSCULAR; INTRAVENOUS at 10:43

## 2024-02-14 RX ADMIN — TOPICAL ANESTHETIC 0.5 ML: 200 SPRAY DENTAL; PERIODONTAL at 10:45

## 2024-02-14 ASSESSMENT — ACTIVITIES OF DAILY LIVING (ADL): ADLS_ACUITY_SCORE: 35

## 2024-02-14 NOTE — H&P
Pre-Endoscopy History and Physical     Chely Sharp MRN# [id number]   YOB: 1990 Age: 33 year old     Date of Procedure: 2/14/2024  Primary care provider: [facility]  Type of Endoscopy: Gastroscopy with possible biopsy, possible dilation  Reason for Procedure: pain  Type of Anesthesia Anticipated: Conscious Sedation    HPI:    Chely is a 33 year old female who will be undergoing the above procedure.      A history and physical has been performed. The patient's medications and allergies have been reviewed. The risks and benefits of the procedure and the sedation options and risks were discussed with the patient.  All questions were answered and informed consent was obtained.      She denies a personal or family history of anesthesia complications or bleeding disorders.     Patient Active Problem List   Diagnosis    H/O LEEP    Cervical high risk HPV (human papillomavirus) test positive        Past Medical History:   Diagnosis Date    Abnormal Pap smear of cervix 09/28/2006, 2/16/19, 2/13/20    See problem list    Cervical high risk HPV (human papillomavirus) test positive 2/13/2020    See problem list    History of colposcopy 03/12/2020    see problem list        Past Surgical History:   Procedure Laterality Date    DILATION AND CURETTAGE SUCTION N/A 3/11/2019    Procedure: DILATION AND CURETTAGE SUCTION;  Surgeon: Jazmyne Machado MD;  Location: [facility] OR    GYN SURGERY      LEEP TX, CERVICAL  03/26/2020    MELLISSA 2-3, margins neg       Social History     Tobacco Use    Smoking status: Never    Smokeless tobacco: Never   Substance Use Topics    Alcohol use: Yes     Comment: socially       History reviewed. No pertinent family history.    Prior to Admission medications    Medication Sig Start Date End Date Taking? Authorizing Provider   albuterol (PROAIR HFA) 108 (90 Base) MCG/ACT inhaler Inhale 1-2 puffs into the lungs every 6 hours as needed for shortness of breath / dyspnea  "or wheezing 1/8/22  Yes Mac Mike MD   benzonatate (TESSALON) 100 MG capsule Take 1 capsule (100 mg) by mouth 3 times daily as needed for cough 1/8/22  Yes Mac Mike MD   ferrous sulfate (FEROSUL) 325 (65 Fe) MG tablet Take 1 tablet (325 mg) by mouth 2 times daily  Patient not taking: Reported on 6/4/2019 3/11/19   Jazmyne Machado MD   ibuprofen (ADVIL/MOTRIN) 600 MG tablet Take 1 tablet (600 mg) by mouth every 6 hours as needed for other (mild and/or inflammatory pain)  Patient not taking: Reported on 6/4/2019 3/11/19   Jazmyne Machado MD   methylergonovine (METHERGINE) 0.2 MG tablet Take 1 tablet (200 mcg) by mouth every 8 hours  Patient not taking: Reported on 6/4/2019 3/11/19   Jazmyne Machado MD       Allergies   Allergen Reactions    Shellfish-Derived Products Hives        REVIEW OF SYSTEMS:   5 point ROS negative except as noted above in HPI, including Gen., Resp., CV, GI &  system review.    PHYSICAL EXAM:   /82   Pulse 96   Temp 97.4  F (36.3  C) (Temporal)   Resp 14   Ht 1.6 m (5' 3\")   Wt 104.3 kg (230 lb)   SpO2 97%   BMI 40.74 kg/m   Estimated body mass index is 40.74 kg/m  as calculated from the following:    Height as of this encounter: 1.6 m (5' 3\").    Weight as of this encounter: 104.3 kg (230 lb).   GENERAL APPEARANCE: alert, and oriented  MENTAL STATUS: alert  AIRWAY EXAM: Mallampatti Class I (visualization of the soft palate, fauces, uvula, anterior and posterior pillars)  RESP: lungs clear to auscultation - no rales, rhonchi or wheezes  CV: regular rates and rhythm  DIAGNOSTICS:    Not indicated    IMPRESSION   ASA Class 2 - Mild systemic disease    PLAN:   Plan for Gastroscopy with possible biopsy, possible dilation. We discussed the risks, benefits and alternatives and the patient wished to proceed.    The above has been forwarded to the consulting provider.      Signed Electronically by: Augusto Nathan MD  February 14, " 2024

## 2024-02-15 LAB
PATH REPORT.COMMENTS IMP SPEC: NORMAL
PATH REPORT.COMMENTS IMP SPEC: NORMAL
PATH REPORT.FINAL DX SPEC: NORMAL
PATH REPORT.GROSS SPEC: NORMAL
PATH REPORT.MICROSCOPIC SPEC OTHER STN: NORMAL
PATH REPORT.RELEVANT HX SPEC: NORMAL
PHOTO IMAGE: NORMAL

## 2024-02-15 PROCEDURE — 88305 TISSUE EXAM BY PATHOLOGIST: CPT | Mod: 26 | Performed by: PATHOLOGY

## 2024-03-08 ENCOUNTER — TRANSCRIBE ORDERS (OUTPATIENT)
Dept: OTHER | Age: 34
End: 2024-03-08

## 2024-03-08 ENCOUNTER — TRANSFERRED RECORDS (OUTPATIENT)
Dept: HEALTH INFORMATION MANAGEMENT | Facility: CLINIC | Age: 34
End: 2024-03-08
Payer: COMMERCIAL

## 2024-03-08 ENCOUNTER — MEDICAL CORRESPONDENCE (OUTPATIENT)
Dept: HEALTH INFORMATION MANAGEMENT | Facility: CLINIC | Age: 34
End: 2024-03-08
Payer: COMMERCIAL

## 2024-03-08 DIAGNOSIS — R63.2 BINGE EATING: ICD-10-CM

## 2024-03-08 DIAGNOSIS — E66.9 OBESITY: Primary | ICD-10-CM

## 2024-06-23 ENCOUNTER — HEALTH MAINTENANCE LETTER (OUTPATIENT)
Age: 34
End: 2024-06-23

## 2025-07-12 ENCOUNTER — HEALTH MAINTENANCE LETTER (OUTPATIENT)
Age: 35
End: 2025-07-12

## (undated) DEVICE — SOL WATER IRRIG 1000ML BOTTLE 2F7114

## (undated) DEVICE — SUCTION CANNULA UTERINE 09MM CVD  21552

## (undated) DEVICE — CATH INTERMITTENT CLEAN-CATH FEMALE 14FR 6" VINYL LF 420614

## (undated) DEVICE — LINEN TOWEL PACK X5 5464

## (undated) DEVICE — SUCTION CANNULA UTERINE 07MM CVD  21853

## (undated) DEVICE — KIT ENDO TURNOVER/PROCEDURE W/CLEAN A SCOPE LINERS 103888

## (undated) DEVICE — ENDO FORCEP ENDOJAW BIOPSY 2.8MMX160CM FB-220K

## (undated) DEVICE — PACK TVT HYSTEROSCOPY SMA15HYFSE

## (undated) DEVICE — DRAPE POUCH IRR 1016

## (undated) DEVICE — TUBING VACUUM COLLECTION 6FT 23116

## (undated) DEVICE — ENDO BITE BLOCK ADULT OLYMPUS LATEX FREE MAJ-1632

## (undated) RX ORDER — PROPOFOL 10 MG/ML
INJECTION, EMULSION INTRAVENOUS
Status: DISPENSED
Start: 2019-03-11

## (undated) RX ORDER — ACETAMINOPHEN 325 MG/1
TABLET ORAL
Status: DISPENSED
Start: 2019-03-11

## (undated) RX ORDER — FENTANYL CITRATE 50 UG/ML
INJECTION, SOLUTION INTRAMUSCULAR; INTRAVENOUS
Status: DISPENSED
Start: 2024-02-14

## (undated) RX ORDER — ONDANSETRON 2 MG/ML
INJECTION INTRAMUSCULAR; INTRAVENOUS
Status: DISPENSED
Start: 2019-03-11

## (undated) RX ORDER — LIDOCAINE HYDROCHLORIDE 20 MG/ML
INJECTION, SOLUTION EPIDURAL; INFILTRATION; INTRACAUDAL; PERINEURAL
Status: DISPENSED
Start: 2019-03-11

## (undated) RX ORDER — FENTANYL CITRATE 50 UG/ML
INJECTION, SOLUTION INTRAMUSCULAR; INTRAVENOUS
Status: DISPENSED
Start: 2019-03-11

## (undated) RX ORDER — DEXAMETHASONE SODIUM PHOSPHATE 4 MG/ML
INJECTION, SOLUTION INTRA-ARTICULAR; INTRALESIONAL; INTRAMUSCULAR; INTRAVENOUS; SOFT TISSUE
Status: DISPENSED
Start: 2019-03-11

## (undated) RX ORDER — KETOROLAC TROMETHAMINE 30 MG/ML
INJECTION, SOLUTION INTRAMUSCULAR; INTRAVENOUS
Status: DISPENSED
Start: 2019-03-11